# Patient Record
Sex: FEMALE | Race: WHITE | NOT HISPANIC OR LATINO
[De-identification: names, ages, dates, MRNs, and addresses within clinical notes are randomized per-mention and may not be internally consistent; named-entity substitution may affect disease eponyms.]

---

## 2017-01-29 ENCOUNTER — RX RENEWAL (OUTPATIENT)
Age: 57
End: 2017-01-29

## 2017-02-13 ENCOUNTER — APPOINTMENT (OUTPATIENT)
Dept: INFECTIOUS DISEASE | Facility: CLINIC | Age: 57
End: 2017-02-13

## 2017-02-13 DIAGNOSIS — Z71.89 OTHER SPECIFIED COUNSELING: ICD-10-CM

## 2017-03-10 ENCOUNTER — APPOINTMENT (OUTPATIENT)
Dept: ORTHOPEDIC SURGERY | Facility: CLINIC | Age: 57
End: 2017-03-10

## 2017-03-10 VITALS — WEIGHT: 119 LBS | BODY MASS INDEX: 19.13 KG/M2 | HEIGHT: 66 IN | RESPIRATION RATE: 16 BRPM

## 2017-03-10 DIAGNOSIS — Z00.00 ENCOUNTER FOR GENERAL ADULT MEDICAL EXAMINATION W/OUT ABNORMAL FINDINGS: ICD-10-CM

## 2017-03-10 DIAGNOSIS — G56.23 LESION OF ULNAR NERVE, BILATERAL UPPER LIMBS: ICD-10-CM

## 2017-04-17 ENCOUNTER — APPOINTMENT (OUTPATIENT)
Dept: PULMONOLOGY | Facility: CLINIC | Age: 57
End: 2017-04-17

## 2017-04-17 VITALS
DIASTOLIC BLOOD PRESSURE: 60 MMHG | SYSTOLIC BLOOD PRESSURE: 90 MMHG | HEART RATE: 69 BPM | BODY MASS INDEX: 19.13 KG/M2 | HEIGHT: 66 IN | WEIGHT: 119 LBS | OXYGEN SATURATION: 100 % | RESPIRATION RATE: 16 BRPM

## 2017-08-03 ENCOUNTER — APPOINTMENT (OUTPATIENT)
Dept: PULMONOLOGY | Facility: CLINIC | Age: 57
End: 2017-08-03
Payer: COMMERCIAL

## 2017-08-03 VITALS
BODY MASS INDEX: 19.13 KG/M2 | HEART RATE: 69 BPM | OXYGEN SATURATION: 100 % | RESPIRATION RATE: 17 BRPM | WEIGHT: 119 LBS | DIASTOLIC BLOOD PRESSURE: 70 MMHG | SYSTOLIC BLOOD PRESSURE: 106 MMHG | HEIGHT: 66 IN

## 2017-08-03 PROCEDURE — 99214 OFFICE O/P EST MOD 30 MIN: CPT | Mod: 25

## 2017-08-03 PROCEDURE — 94010 BREATHING CAPACITY TEST: CPT

## 2017-08-03 RX ORDER — EMOLLIENT COMBINATION NO.53
CREAM (GRAM) TOPICAL
Qty: 450 | Refills: 0 | Status: ACTIVE | COMMUNITY
Start: 2016-10-17

## 2017-08-03 RX ORDER — HALOBETASOL PROPIONATE 0.5 MG/G
0.05 CREAM TOPICAL
Qty: 15 | Refills: 0 | Status: ACTIVE | COMMUNITY
Start: 2016-10-17

## 2017-09-22 ENCOUNTER — APPOINTMENT (OUTPATIENT)
Dept: INFECTIOUS DISEASE | Facility: CLINIC | Age: 57
End: 2017-09-22

## 2017-10-20 ENCOUNTER — APPOINTMENT (OUTPATIENT)
Dept: INFECTIOUS DISEASE | Facility: CLINIC | Age: 57
End: 2017-10-20
Payer: SELF-PAY

## 2017-10-20 PROCEDURE — 90471 IMMUNIZATION ADMIN: CPT | Mod: NC

## 2017-10-20 PROCEDURE — 90632 HEPA VACCINE ADULT IM: CPT

## 2017-11-09 ENCOUNTER — APPOINTMENT (OUTPATIENT)
Dept: SURGICAL ONCOLOGY | Facility: CLINIC | Age: 57
End: 2017-11-09
Payer: COMMERCIAL

## 2017-11-09 VITALS
OXYGEN SATURATION: 94 % | DIASTOLIC BLOOD PRESSURE: 67 MMHG | BODY MASS INDEX: 19.13 KG/M2 | SYSTOLIC BLOOD PRESSURE: 109 MMHG | HEIGHT: 66 IN | HEART RATE: 75 BPM | WEIGHT: 119 LBS | RESPIRATION RATE: 16 BRPM

## 2017-11-09 PROCEDURE — 99214 OFFICE O/P EST MOD 30 MIN: CPT

## 2017-11-15 ENCOUNTER — TRANSCRIPTION ENCOUNTER (OUTPATIENT)
Age: 57
End: 2017-11-15

## 2017-11-20 ENCOUNTER — FORM ENCOUNTER (OUTPATIENT)
Age: 57
End: 2017-11-20

## 2017-11-21 ENCOUNTER — APPOINTMENT (OUTPATIENT)
Dept: MAMMOGRAPHY | Facility: CLINIC | Age: 57
End: 2017-11-21

## 2017-11-21 ENCOUNTER — APPOINTMENT (OUTPATIENT)
Dept: ULTRASOUND IMAGING | Facility: CLINIC | Age: 57
End: 2017-11-21

## 2017-11-21 ENCOUNTER — OUTPATIENT (OUTPATIENT)
Dept: OUTPATIENT SERVICES | Facility: HOSPITAL | Age: 57
LOS: 1 days | End: 2017-11-21
Payer: COMMERCIAL

## 2017-11-21 DIAGNOSIS — Z00.8 ENCOUNTER FOR OTHER GENERAL EXAMINATION: ICD-10-CM

## 2017-11-21 PROCEDURE — 77063 BREAST TOMOSYNTHESIS BI: CPT

## 2017-11-21 PROCEDURE — 77067 SCR MAMMO BI INCL CAD: CPT

## 2017-11-21 PROCEDURE — 76641 ULTRASOUND BREAST COMPLETE: CPT

## 2018-01-18 ENCOUNTER — APPOINTMENT (OUTPATIENT)
Dept: ULTRASOUND IMAGING | Facility: CLINIC | Age: 58
End: 2018-01-18

## 2018-01-18 ENCOUNTER — OUTPATIENT (OUTPATIENT)
Dept: OUTPATIENT SERVICES | Facility: HOSPITAL | Age: 58
LOS: 1 days | End: 2018-01-18
Payer: COMMERCIAL

## 2018-01-18 DIAGNOSIS — Z00.8 ENCOUNTER FOR OTHER GENERAL EXAMINATION: ICD-10-CM

## 2018-01-18 PROCEDURE — 76856 US EXAM PELVIC COMPLETE: CPT

## 2018-01-18 PROCEDURE — 76830 TRANSVAGINAL US NON-OB: CPT | Mod: 26

## 2018-01-18 PROCEDURE — 76830 TRANSVAGINAL US NON-OB: CPT

## 2018-01-18 PROCEDURE — 76856 US EXAM PELVIC COMPLETE: CPT | Mod: 26

## 2018-02-05 ENCOUNTER — APPOINTMENT (OUTPATIENT)
Dept: PULMONOLOGY | Facility: CLINIC | Age: 58
End: 2018-02-05
Payer: COMMERCIAL

## 2018-02-05 VITALS
WEIGHT: 119 LBS | OXYGEN SATURATION: 98 % | DIASTOLIC BLOOD PRESSURE: 60 MMHG | HEART RATE: 67 BPM | BODY MASS INDEX: 19.13 KG/M2 | SYSTOLIC BLOOD PRESSURE: 100 MMHG | HEIGHT: 66 IN | RESPIRATION RATE: 14 BRPM

## 2018-02-05 PROCEDURE — 99214 OFFICE O/P EST MOD 30 MIN: CPT | Mod: 25

## 2018-02-05 PROCEDURE — 95012 NITRIC OXIDE EXP GAS DETER: CPT

## 2018-02-05 PROCEDURE — 94010 BREATHING CAPACITY TEST: CPT

## 2018-02-05 RX ORDER — AZITHROMYCIN 250 MG/1
250 TABLET, FILM COATED ORAL
Qty: 5 | Refills: 0 | Status: DISCONTINUED | COMMUNITY
Start: 2017-02-13 | End: 2018-02-05

## 2018-02-09 ENCOUNTER — APPOINTMENT (OUTPATIENT)
Dept: ORTHOPEDIC SURGERY | Facility: CLINIC | Age: 58
End: 2018-02-09
Payer: COMMERCIAL

## 2018-02-09 VITALS — WEIGHT: 119 LBS | BODY MASS INDEX: 19.13 KG/M2 | RESPIRATION RATE: 16 BRPM | HEIGHT: 66 IN

## 2018-02-09 PROCEDURE — 99213 OFFICE O/P EST LOW 20 MIN: CPT | Mod: 25

## 2018-02-09 PROCEDURE — 73110 X-RAY EXAM OF WRIST: CPT | Mod: 50

## 2018-02-09 PROCEDURE — 20605 DRAIN/INJ JOINT/BURSA W/O US: CPT | Mod: RT

## 2018-08-02 ENCOUNTER — APPOINTMENT (OUTPATIENT)
Dept: PULMONOLOGY | Facility: CLINIC | Age: 58
End: 2018-08-02
Payer: COMMERCIAL

## 2018-08-02 VITALS
WEIGHT: 119 LBS | HEART RATE: 82 BPM | BODY MASS INDEX: 19.13 KG/M2 | OXYGEN SATURATION: 99 % | SYSTOLIC BLOOD PRESSURE: 100 MMHG | DIASTOLIC BLOOD PRESSURE: 70 MMHG | HEIGHT: 66 IN | RESPIRATION RATE: 17 BRPM

## 2018-08-02 PROCEDURE — 95012 NITRIC OXIDE EXP GAS DETER: CPT

## 2018-08-02 PROCEDURE — 94010 BREATHING CAPACITY TEST: CPT

## 2018-08-02 PROCEDURE — 99214 OFFICE O/P EST MOD 30 MIN: CPT | Mod: 25

## 2018-08-02 RX ORDER — ALBUTEROL SULFATE 90 UG/1
108 (90 BASE) AEROSOL, METERED RESPIRATORY (INHALATION) EVERY 6 HOURS
Qty: 3 | Refills: 1 | Status: ACTIVE | COMMUNITY
Start: 2018-08-02 | End: 1900-01-01

## 2018-08-02 RX ORDER — AMOXICILLIN AND CLAVULANATE POTASSIUM 875; 125 MG/1; MG/1
875-125 TABLET, COATED ORAL
Qty: 10 | Refills: 0 | Status: DISCONTINUED | COMMUNITY
Start: 2017-03-16 | End: 2018-08-02

## 2018-11-12 ENCOUNTER — NON-APPOINTMENT (OUTPATIENT)
Age: 58
End: 2018-11-12

## 2018-11-12 ENCOUNTER — APPOINTMENT (OUTPATIENT)
Dept: PULMONOLOGY | Facility: CLINIC | Age: 58
End: 2018-11-12
Payer: COMMERCIAL

## 2018-11-12 VITALS
SYSTOLIC BLOOD PRESSURE: 92 MMHG | DIASTOLIC BLOOD PRESSURE: 64 MMHG | WEIGHT: 119 LBS | OXYGEN SATURATION: 99 % | RESPIRATION RATE: 17 BRPM | BODY MASS INDEX: 19.13 KG/M2 | HEART RATE: 77 BPM | HEIGHT: 66 IN

## 2018-11-12 PROCEDURE — 99214 OFFICE O/P EST MOD 30 MIN: CPT | Mod: 25

## 2018-11-12 PROCEDURE — 94010 BREATHING CAPACITY TEST: CPT

## 2018-11-13 RX ORDER — FLUCONAZOLE 150 MG/1
150 TABLET ORAL
Qty: 7 | Refills: 0 | Status: COMPLETED | COMMUNITY
Start: 2018-07-30

## 2018-11-13 RX ORDER — PREDNISONE 20 MG/1
20 TABLET ORAL
Qty: 3 | Refills: 0 | Status: COMPLETED | COMMUNITY
Start: 2018-11-06

## 2018-11-13 RX ORDER — ALPRAZOLAM 0.5 MG/1
0.5 TABLET ORAL
Qty: 30 | Refills: 0 | Status: ACTIVE | COMMUNITY
Start: 2018-08-13

## 2018-11-13 RX ORDER — CYCLOBENZAPRINE HYDROCHLORIDE 10 MG/1
10 TABLET, FILM COATED ORAL
Qty: 20 | Refills: 0 | Status: ACTIVE | COMMUNITY
Start: 2018-09-21

## 2018-11-13 RX ORDER — DICLOFENAC SODIUM 10 MG/G
1 GEL TOPICAL
Qty: 100 | Refills: 0 | Status: ACTIVE | COMMUNITY
Start: 2018-10-05

## 2018-11-25 PROBLEM — N60.99 BENIGN BREAST DISEASE: Status: ACTIVE | Noted: 2017-11-09

## 2018-11-26 ENCOUNTER — APPOINTMENT (OUTPATIENT)
Dept: SURGICAL ONCOLOGY | Facility: CLINIC | Age: 58
End: 2018-11-26
Payer: COMMERCIAL

## 2018-11-26 VITALS
BODY MASS INDEX: 19.13 KG/M2 | WEIGHT: 119 LBS | RESPIRATION RATE: 15 BRPM | DIASTOLIC BLOOD PRESSURE: 73 MMHG | HEART RATE: 67 BPM | SYSTOLIC BLOOD PRESSURE: 116 MMHG | HEIGHT: 66 IN

## 2018-11-26 DIAGNOSIS — N60.99 UNSPECIFIED BENIGN MAMMARY DYSPLASIA OF UNSPECIFIED BREAST: ICD-10-CM

## 2018-11-26 DIAGNOSIS — R92.8 OTHER ABNORMAL AND INCONCLUSIVE FINDINGS ON DIAGNOSTIC IMAGING OF BREAST: ICD-10-CM

## 2018-11-26 PROCEDURE — 99213 OFFICE O/P EST LOW 20 MIN: CPT

## 2018-12-13 ENCOUNTER — FORM ENCOUNTER (OUTPATIENT)
Age: 58
End: 2018-12-13

## 2018-12-14 ENCOUNTER — APPOINTMENT (OUTPATIENT)
Dept: MAMMOGRAPHY | Facility: CLINIC | Age: 58
End: 2018-12-14

## 2018-12-14 ENCOUNTER — OUTPATIENT (OUTPATIENT)
Dept: OUTPATIENT SERVICES | Facility: HOSPITAL | Age: 58
LOS: 1 days | End: 2018-12-14
Payer: COMMERCIAL

## 2018-12-14 ENCOUNTER — APPOINTMENT (OUTPATIENT)
Dept: ULTRASOUND IMAGING | Facility: CLINIC | Age: 58
End: 2018-12-14

## 2018-12-14 DIAGNOSIS — Z00.8 ENCOUNTER FOR OTHER GENERAL EXAMINATION: ICD-10-CM

## 2018-12-14 PROCEDURE — 77067 SCR MAMMO BI INCL CAD: CPT | Mod: 26

## 2018-12-14 PROCEDURE — 77067 SCR MAMMO BI INCL CAD: CPT

## 2018-12-14 PROCEDURE — 76641 ULTRASOUND BREAST COMPLETE: CPT

## 2018-12-14 PROCEDURE — 77063 BREAST TOMOSYNTHESIS BI: CPT

## 2018-12-14 PROCEDURE — 77063 BREAST TOMOSYNTHESIS BI: CPT | Mod: 26

## 2018-12-14 PROCEDURE — 76641 ULTRASOUND BREAST COMPLETE: CPT | Mod: 26,50

## 2018-12-17 PROBLEM — R92.8 ABNORMAL FINDING ON BREAST IMAGING: Status: ACTIVE | Noted: 2018-12-17

## 2018-12-20 ENCOUNTER — FORM ENCOUNTER (OUTPATIENT)
Age: 58
End: 2018-12-20

## 2018-12-21 ENCOUNTER — OUTPATIENT (OUTPATIENT)
Dept: OUTPATIENT SERVICES | Facility: HOSPITAL | Age: 58
LOS: 1 days | End: 2018-12-21
Payer: COMMERCIAL

## 2018-12-21 ENCOUNTER — APPOINTMENT (OUTPATIENT)
Dept: MAMMOGRAPHY | Facility: CLINIC | Age: 58
End: 2018-12-21
Payer: COMMERCIAL

## 2018-12-21 DIAGNOSIS — Z00.8 ENCOUNTER FOR OTHER GENERAL EXAMINATION: ICD-10-CM

## 2018-12-21 PROCEDURE — 77065 DX MAMMO INCL CAD UNI: CPT | Mod: 26,RT

## 2018-12-21 PROCEDURE — 77065 DX MAMMO INCL CAD UNI: CPT

## 2019-02-13 ENCOUNTER — RX RENEWAL (OUTPATIENT)
Age: 59
End: 2019-02-13

## 2019-04-02 ENCOUNTER — APPOINTMENT (OUTPATIENT)
Dept: PULMONOLOGY | Facility: CLINIC | Age: 59
End: 2019-04-02
Payer: COMMERCIAL

## 2019-04-02 VITALS
HEIGHT: 66 IN | BODY MASS INDEX: 19.29 KG/M2 | OXYGEN SATURATION: 96 % | RESPIRATION RATE: 16 BRPM | DIASTOLIC BLOOD PRESSURE: 68 MMHG | HEART RATE: 75 BPM | WEIGHT: 120 LBS | SYSTOLIC BLOOD PRESSURE: 104 MMHG

## 2019-04-02 PROCEDURE — 99214 OFFICE O/P EST MOD 30 MIN: CPT | Mod: 25

## 2019-04-02 PROCEDURE — 94010 BREATHING CAPACITY TEST: CPT

## 2019-04-02 NOTE — PROCEDURE
[FreeTextEntry1] : PFT- spi reveals normal flows; FEV1 was 2.60L which is 97% of predicted; normal flow volume loop

## 2019-04-02 NOTE — ADDENDUM
[FreeTextEntry1] : Documented by Yandel Wong acting as a scribe for Dr. Paulo Vidal on 4/2//2019\par \par All medical record entries made by the Scribe were at my, Dr. Paulo Vidal's, direction and personally dictated by me on 4/2/2019. I have reviewed the chart and agree that the record accurately reflects my personal performance of the history, physical exam, assessment and plan. I have also personally directed, reviewed, and agree with the discharge instructions. \par \par \par \par \par

## 2019-04-02 NOTE — ASSESSMENT
[FreeTextEntry1] : Ms. Cooley has a history of asthma, allergy, BOOP, and sicca. She is currently stable from a pulmonary perspective. \par \par Her shortness of breath is multifactorial due to:\par -asthma\par -allergies\par -former BOOP\par -poor mechanics of breathing \par \par problem 1: asthma- stable at this time \par -continue to use Anoro at 1 inhalation QD\par -continue to use Ventolin PRN before exercise\par -continue to use Singulair 10 mg before exercise \par -Asthma is  believed to be caused by inherited (genetic) and environmental factor, but its exact cause is unknown. Asthma may be triggered by allergens, lung infections, or irritants in the air. Asthma triggers are different for each person \par -Inhaler technique reviewed as well as oral hygiene techniques reviewed with patient. Avoidance of cold air, extremes of temperature, rescue inhaler should be used before exercise. Order of medication reviewed with patient. Recommended use of a cool mist humidifier in the bedroom. \par \par problem 2: allergies\par -continue to use nasal saline PRN\par -Environmental measures for allergies were encouraged including mattress and pillow cover, air purifier, and environmental controls. \par \par problem 3: BOOP\par -in remission\par \par problem 4: sicca\par -recommended to gargle and spit after using the inhaler\par -blood work to include: SED rate, Sjogren's panel, and rheumatoid factor (pending)\par \par problem 5: epistaxis\par -recommended nasal gel/ Boroleum ointment \par \par problem 6: health maintenance \par -recommended to try optimal electrolytes\par -recommended yearly flu shot after October 15\par -recommended strep pneumonia vaccines: Prevnar-13 vaccine, followed by Pneumo vaccine 23 one year following\par -recommended early intervention for URIs\par -recommended regular osteoporosis evaluations\par -recommended early dermatological evaluations\par -recommended after the age of 50 to the age of 70, colonoscopy every 5 years \par \par \par F/U in 6 months\par She is encouraged to call with any changes, concerns, or questions

## 2019-04-02 NOTE — HISTORY OF PRESENT ILLNESS
[FreeTextEntry1] : Ms. Cooley a 57 year old female presenting to the office for a follow up visit for allergic rhinitis, asthma, BOOP, sicca, and shortness of breath. Her chief complaint is exercise-induced SOB. \par - She has noticed that in her spin class, she feels that she is the only one that needs to takes a break\par - She states that she takes her inhalers prior to exercise. \par - She otherwise feels great \par - She states that her energy level is 10/10 \par - She denies any limitations with exercise\par - Her weight is stable \par - She states that her vision is poor requiring glasses. \par - She states that she always has a dry mouth. \par - She denies any headaches, nausea, vomiting, fever, chills, sweats, chest pain, chest pressure, palpitations, coughing, wheezing, fatigue, diarrhea, constipation, dysphagia, myalgias, dizziness, leg swelling, leg pain, itchy eyes, itchy ears, heartburn, reflux, or sour taste in the mouth.

## 2019-04-02 NOTE — REASON FOR VISIT
[Follow-Up] : a follow-up visit [FreeTextEntry1] : allergic rhinitis, asthma, BOOP, sicca, and shortness of breath

## 2019-04-19 ENCOUNTER — TRANSCRIPTION ENCOUNTER (OUTPATIENT)
Age: 59
End: 2019-04-19

## 2019-06-25 ENCOUNTER — FORM ENCOUNTER (OUTPATIENT)
Age: 59
End: 2019-06-25

## 2019-06-26 ENCOUNTER — APPOINTMENT (OUTPATIENT)
Dept: MRI IMAGING | Facility: CLINIC | Age: 59
End: 2019-06-26
Payer: COMMERCIAL

## 2019-06-26 ENCOUNTER — OUTPATIENT (OUTPATIENT)
Dept: OUTPATIENT SERVICES | Facility: HOSPITAL | Age: 59
LOS: 1 days | End: 2019-06-26
Payer: COMMERCIAL

## 2019-06-26 DIAGNOSIS — Z00.8 ENCOUNTER FOR OTHER GENERAL EXAMINATION: ICD-10-CM

## 2019-06-26 DIAGNOSIS — Z91.89 OTHER SPECIFIED PERSONAL RISK FACTORS, NOT ELSEWHERE CLASSIFIED: ICD-10-CM

## 2019-06-26 PROCEDURE — 77049 MRI BREAST C-+ W/CAD BI: CPT | Mod: 26

## 2019-06-26 PROCEDURE — C8937: CPT

## 2019-06-26 PROCEDURE — C8908: CPT

## 2019-07-10 ENCOUNTER — FORM ENCOUNTER (OUTPATIENT)
Age: 59
End: 2019-07-10

## 2019-07-11 ENCOUNTER — APPOINTMENT (OUTPATIENT)
Dept: ULTRASOUND IMAGING | Facility: IMAGING CENTER | Age: 59
End: 2019-07-11
Payer: COMMERCIAL

## 2019-07-11 ENCOUNTER — OUTPATIENT (OUTPATIENT)
Dept: OUTPATIENT SERVICES | Facility: HOSPITAL | Age: 59
LOS: 1 days | End: 2019-07-11
Payer: COMMERCIAL

## 2019-07-11 DIAGNOSIS — R92.8 OTHER ABNORMAL AND INCONCLUSIVE FINDINGS ON DIAGNOSTIC IMAGING OF BREAST: ICD-10-CM

## 2019-07-11 PROCEDURE — 76705 ECHO EXAM OF ABDOMEN: CPT | Mod: 26

## 2019-07-11 PROCEDURE — 76705 ECHO EXAM OF ABDOMEN: CPT

## 2019-10-15 ENCOUNTER — APPOINTMENT (OUTPATIENT)
Dept: PULMONOLOGY | Facility: CLINIC | Age: 59
End: 2019-10-15
Payer: COMMERCIAL

## 2019-10-15 ENCOUNTER — NON-APPOINTMENT (OUTPATIENT)
Age: 59
End: 2019-10-15

## 2019-10-15 VITALS
SYSTOLIC BLOOD PRESSURE: 110 MMHG | HEIGHT: 66 IN | BODY MASS INDEX: 19.13 KG/M2 | RESPIRATION RATE: 16 BRPM | HEART RATE: 65 BPM | WEIGHT: 119 LBS | DIASTOLIC BLOOD PRESSURE: 70 MMHG | OXYGEN SATURATION: 98 %

## 2019-10-15 PROCEDURE — 94010 BREATHING CAPACITY TEST: CPT

## 2019-10-15 PROCEDURE — 99214 OFFICE O/P EST MOD 30 MIN: CPT | Mod: 25

## 2019-10-15 PROCEDURE — 95012 NITRIC OXIDE EXP GAS DETER: CPT

## 2019-10-15 NOTE — ADDENDUM
[FreeTextEntry1] : All medical record entries made by justin Covarrubias were at Dr. Paulo Vidal's, direction and personally dictated by me on 10/15/2018. I have reviewed the chart and agree that the record accurately reflects my personal performance of the history, physical exam, assessment and plan. I have also personally directed, reviewed, and agree with the discharge instructions.

## 2019-10-15 NOTE — HISTORY OF PRESENT ILLNESS
[FreeTextEntry1] : Ms. Cooley a 57 year old female presenting to the office for a follow up visit for allergic rhinitis, asthma, BOOP, sicca, and shortness of breath. Her chief complaint is exercise-induced SOB. \par -7-8 hrs of sleep\par -complaints of vision\par -family is doing well\par - She otherwise feels great \par - She states that her energy level is 10/10 \par - She denies any limitations with exercise\par - Her weight is stable \par - She states that she always has a dry mouth. \par - She denies any headaches, nausea, vomiting, fever, chills, sweats, chest pain, chest pressure, palpitations, coughing, wheezing, fatigue, diarrhea, constipation, dysphagia, myalgias, dizziness, leg swelling, leg pain, itchy eyes, itchy ears, heartburn, reflux, or sour taste in the mouth.

## 2019-10-15 NOTE — PHYSICAL EXAM
[General Appearance - Well Developed] : well developed [Well Groomed] : well groomed [Normal Appearance] : normal appearance [No Deformities] : no deformities [General Appearance - Well Nourished] : well nourished [Normal Conjunctiva] : the conjunctiva exhibited no abnormalities [General Appearance - In No Acute Distress] : no acute distress [Eyelids - No Xanthelasma] : the eyelids demonstrated no xanthelasmas [Normal Oropharynx] : normal oropharynx [Neck Appearance] : the appearance of the neck was normal [II] : II [Thyroid Diffuse Enlargement] : the thyroid was not enlarged [Jugular Venous Distention Increased] : there was no jugular-venous distention [Neck Cervical Mass (___cm)] : no neck mass was observed [Heart Sounds] : normal S1 and S2 [Heart Rate And Rhythm] : heart rate and rhythm were normal [Thyroid Nodule] : there were no palpable thyroid nodules [Murmurs] : no murmurs present [Respiration, Rhythm And Depth] : normal respiratory rhythm and effort [Exaggerated Use Of Accessory Muscles For Inspiration] : no accessory muscle use [Auscultation Breath Sounds / Voice Sounds] : lungs were clear to auscultation bilaterally [Abdomen Soft] : soft [Abdomen Tenderness] : non-tender [Abnormal Walk] : normal gait [Abdomen Mass (___ Cm)] : no abdominal mass palpated [Nail Clubbing] : no clubbing of the fingernails [Gait - Sufficient For Exercise Testing] : the gait was sufficient for exercise testing [Petechial Hemorrhages (___cm)] : no petechial hemorrhages [Cyanosis, Localized] : no localized cyanosis [] : no rash [No Venous Stasis] : no venous stasis [Skin Color & Pigmentation] : normal skin color and pigmentation [Skin Lesions] : no skin lesions [No Skin Ulcers] : no skin ulcer [Deep Tendon Reflexes (DTR)] : deep tendon reflexes were 2+ and symmetric [No Xanthoma] : no  xanthoma was observed [Sensation] : the sensory exam was normal to light touch and pinprick [Oriented To Time, Place, And Person] : oriented to person, place, and time [No Focal Deficits] : no focal deficits [Impaired Insight] : insight and judgment were intact [Affect] : the affect was normal [FreeTextEntry1] : I:E 1:3, clear

## 2019-10-15 NOTE — PROCEDURE
[FreeTextEntry1] : PFT- spi reveals normal flows; FEV1 was 3.69L which is 102% of predicted; normal flow volume loop \par \par FENO was 8; a normal value being less than 25\par \par Fractional exhaled nitric oxide (FENO) is regarded as a simple, noninvasive method for assessing eosinophilic airway inflammation. Produced by a variety of cells within the lung, nitric oxide (NO) concentrations are generally low in healthy individuals. However, high concentrations of NO appear to be involved in nonspecific host defense mechanisms and chronic inflammatory diseases such as asthma. The American Thoracic Society (ATS) therefore has recommended using FENO to aid in the diagnosis and monitoring of eosinophilic airway inflammation and asthma, and for identifying steroid responsive individuals whose chronic respiratory symptoms may be caused by airway inflammation. \par \par

## 2019-11-08 ENCOUNTER — APPOINTMENT (OUTPATIENT)
Dept: ORTHOPEDIC SURGERY | Facility: CLINIC | Age: 59
End: 2019-11-08
Payer: COMMERCIAL

## 2019-11-08 VITALS — BODY MASS INDEX: 19.13 KG/M2 | HEIGHT: 66 IN | WEIGHT: 119 LBS | RESPIRATION RATE: 16 BRPM

## 2019-11-08 DIAGNOSIS — M25.731 OSTEOPHYTE, RIGHT WRIST: ICD-10-CM

## 2019-11-08 DIAGNOSIS — M24.131 OTHER ARTICULAR CARTILAGE DISORDERS, RIGHT WRIST: ICD-10-CM

## 2019-11-08 DIAGNOSIS — M77.01 MEDIAL EPICONDYLITIS, RIGHT ELBOW: ICD-10-CM

## 2019-11-08 DIAGNOSIS — G56.03 CARPAL TUNNEL SYNDROM,BILATERAL UPPER LIMBS: ICD-10-CM

## 2019-11-08 DIAGNOSIS — M77.11 LATERAL EPICONDYLITIS, RIGHT ELBOW: ICD-10-CM

## 2019-11-08 PROCEDURE — 73070 X-RAY EXAM OF ELBOW: CPT | Mod: RT

## 2019-11-08 PROCEDURE — 99214 OFFICE O/P EST MOD 30 MIN: CPT | Mod: 25

## 2019-11-08 PROCEDURE — 73110 X-RAY EXAM OF WRIST: CPT | Mod: 50

## 2019-11-08 PROCEDURE — 20605 DRAIN/INJ JOINT/BURSA W/O US: CPT | Mod: RT

## 2019-11-08 PROCEDURE — 20600 DRAIN/INJ JOINT/BURSA W/O US: CPT | Mod: F5

## 2019-11-08 NOTE — PHYSICAL EXAM
[de-identified] : Physical exam shows the patient to be alert and oriented x3, capable of ambulation. The patient is well-developed and well-nourished in no apparent respiratory distress. Majority of the skin is intact bilaterally in the upper extremities without lymphadenopathy at the elbows.\par \par There is tenderness over the right medial epicondyles evidence of instability no joint effusion. There is no tenderness or instability medial lateral collateral ligaments the biceps and triceps are nontender and intact with full range of motion the elbow without pain pump for his pronation supination flexion or extension. There is tenderness over the medial upper condyle which is accentuated by wrist flexion and pronation on the right side. Negative on the left there is no instability of the ulnar nerve but there is positive Tinel's and Phalen's over the nerve as compared the left side.\par \par There is tenderness over the right basal joint with a positive grind test and shoulder deformity. There is a positive crank test. There is swelling appreciated over the affected CMC joint which is not present on the opposite side. There is no evidence of infection or lymphadenopathy bilaterally to the level of the elbows There is no evidence of MCP hyperextension bilaterally. There is a negative Finkelstein's test There is no tenderness over the A-1 pulleys bilaterally. 5/5 strength bilaterally. \par \par The wrists have a symmetric and full range of motion bilaterally with no pain upon forced flexion, extension, pronation and supination. There is no tenderness over the scaphoid scapholunate region ligaments and no tenderness of the TFCC bilaterally. There is no tenderness of the pisotriquetral hamate hook. The second through fifth CMC his is stable and nontender bilaterally.\par \par There is a positive Tinel's and Phalen's over the median nerve the right wrist\par \par Sensation is grossly intact [de-identified] : PA lateral of the right elbow shows joint space is well preserved with minimal degenerative changes and no evidence of soft tissue calcifications.\par \par PA lateral and oblique of the wrist bilaterally show minimal degenerative changes of the CMC and STT joints with joint space is well preserved and symmetric bilaterally.\par \par Scapholunate ligament stress views and neutral radial and ulnar deviation bilaterally show no evidence of instability as compared the opposite asymptomatic side.

## 2019-11-08 NOTE — HISTORY OF PRESENT ILLNESS
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 She had a steroid injection on 2/9/2018 on the wrist which lasted 6 months. EMG studies done on 2/28/19 showed abnormal studies of both wrists, mild bilateral carpal tunnel syndrome and bilateral median nerve entrapment

## 2019-11-08 NOTE — ASSESSMENT
[FreeTextEntry1] : Patient chief complaint appear to be coming from her right thumb CMC joint of 3 months duration without history of trauma.\par \par She continues to have right elbow medial epicondylitis and cubital tunnel syndrome. This is not demonstrated on electrodiagnostic studies with carpal tunnel syndrome is seen on electrodiagnostic studies.\par \par The risks, benefits, alternatives and associated differential diagnosis was discussed with the patient. Options ranged from conservative care to surgical intervention were reviewed and all questions answered. Patient appeared to have an excellent understanding of the risks as well as differential diagnosis associated with this condition.\par \par She elected to proceed with an injection for the right thumb CMC joint and right elbow medial epicondylitis with therapy for the cubital tunnel and other issues.\par \par It is hoped that this has a long-lasting beneficial therapeutic effect. If symptoms are not fully resolved by 4-6 weeks the patient was asked to return to my office for reevaluation. If symptoms are resolved they can return on an as-needed basis.\par She may also consider rheumatologic workup due to that her multiple joint complaints.\par If symptoms continue she should return to the office for reevaluation and more aggressive forms of treatment may be considered such as  surgery.

## 2019-12-02 ENCOUNTER — APPOINTMENT (OUTPATIENT)
Dept: SURGICAL ONCOLOGY | Facility: CLINIC | Age: 59
End: 2019-12-02

## 2020-01-09 ENCOUNTER — FORM ENCOUNTER (OUTPATIENT)
Age: 60
End: 2020-01-09

## 2020-01-10 ENCOUNTER — APPOINTMENT (OUTPATIENT)
Dept: MAMMOGRAPHY | Facility: CLINIC | Age: 60
End: 2020-01-10
Payer: COMMERCIAL

## 2020-01-10 ENCOUNTER — APPOINTMENT (OUTPATIENT)
Dept: ULTRASOUND IMAGING | Facility: CLINIC | Age: 60
End: 2020-01-10
Payer: COMMERCIAL

## 2020-01-10 ENCOUNTER — OUTPATIENT (OUTPATIENT)
Dept: OUTPATIENT SERVICES | Facility: HOSPITAL | Age: 60
LOS: 1 days | End: 2020-01-10
Payer: COMMERCIAL

## 2020-01-10 DIAGNOSIS — Z00.8 ENCOUNTER FOR OTHER GENERAL EXAMINATION: ICD-10-CM

## 2020-01-10 PROCEDURE — 77063 BREAST TOMOSYNTHESIS BI: CPT | Mod: 26

## 2020-01-10 PROCEDURE — 76641 ULTRASOUND BREAST COMPLETE: CPT | Mod: 26,50

## 2020-01-10 PROCEDURE — 77063 BREAST TOMOSYNTHESIS BI: CPT

## 2020-01-10 PROCEDURE — 77067 SCR MAMMO BI INCL CAD: CPT

## 2020-01-10 PROCEDURE — 76641 ULTRASOUND BREAST COMPLETE: CPT

## 2020-01-10 PROCEDURE — 77067 SCR MAMMO BI INCL CAD: CPT | Mod: 26

## 2020-01-14 ENCOUNTER — FORM ENCOUNTER (OUTPATIENT)
Age: 60
End: 2020-01-14

## 2020-01-15 ENCOUNTER — APPOINTMENT (OUTPATIENT)
Dept: MRI IMAGING | Facility: CLINIC | Age: 60
End: 2020-01-15
Payer: COMMERCIAL

## 2020-01-15 ENCOUNTER — OUTPATIENT (OUTPATIENT)
Dept: OUTPATIENT SERVICES | Facility: HOSPITAL | Age: 60
LOS: 1 days | End: 2020-01-15
Payer: COMMERCIAL

## 2020-01-15 DIAGNOSIS — R92.8 OTHER ABNORMAL AND INCONCLUSIVE FINDINGS ON DIAGNOSTIC IMAGING OF BREAST: ICD-10-CM

## 2020-01-15 PROCEDURE — 77049 MRI BREAST C-+ W/CAD BI: CPT | Mod: 26

## 2020-01-15 PROCEDURE — C8937: CPT

## 2020-01-15 PROCEDURE — A9585: CPT

## 2020-01-15 PROCEDURE — C8908: CPT

## 2020-03-06 ENCOUNTER — APPOINTMENT (OUTPATIENT)
Dept: SURGICAL ONCOLOGY | Facility: CLINIC | Age: 60
End: 2020-03-06
Payer: COMMERCIAL

## 2020-03-06 VITALS
RESPIRATION RATE: 16 BRPM | SYSTOLIC BLOOD PRESSURE: 118 MMHG | HEART RATE: 60 BPM | WEIGHT: 119 LBS | BODY MASS INDEX: 19.13 KG/M2 | HEIGHT: 66 IN | OXYGEN SATURATION: 100 % | DIASTOLIC BLOOD PRESSURE: 71 MMHG

## 2020-03-06 PROCEDURE — 99214 OFFICE O/P EST MOD 30 MIN: CPT

## 2020-03-06 NOTE — PHYSICAL EXAM
[Normal] : supple, no neck mass and thyroid not enlarged [Normal Neck Lymph Nodes] : normal neck lymph nodes  [Normal Supraclavicular Lymph Nodes] : normal supraclavicular lymph nodes [Normal Axillary Lymph Nodes] : normal axillary lymph nodes [Normal] : normal appearance, no rash, nodules, vesicles, ulcers, erythema [de-identified] : Groins not examined [de-identified] : below

## 2020-03-06 NOTE — ASSESSMENT
[FreeTextEntry1] : January 2020:\par Annual bilateral breast MRI (increased risk of breast cancer): BI-RADS 3.\par A prescription for a 6 month followup MRI in July 2020 he has already been entered - verified today.\par \par January 2020 bilateral mammogram and sonogram at [facility]: BI-RADS 2.\par Prescription entered today for January 2021\par \par Previously incidental hepatic finding on her 2019 breast MRI was a cyst on abdominal ultrasound.\par We will reassess this in July 2020, that prescription was entered today also.\par \par Clinically doing well.\par \par If asymptomatic, with normal imaging, we will see her in another year, sooner if needed

## 2020-03-06 NOTE — REASON FOR VISIT
[Follow-Up Visit] : a follow-up visit for [Other: _____] : [unfilled] [FreeTextEntry2] : Genetic predisposition to breast cancer

## 2020-03-06 NOTE — HISTORY OF PRESENT ILLNESS
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 \par \par Menarche was at age 14. \par She's had 2 pregnancies, both delivered, first at 26.\par \par Her Nette risk score is 17.\par Breast cancer risk score of (calculated 11-25-18) = 35.2\par \par \par Her internist is Dr. Steven GOLDBERG.\par \par She does not have a pacemaker or defibrillator.\par She takes no anticoagulants.\par \par +Asthma.\par She sees Dr. Paulo LUI\par She uses inhalers.\par \par \par GYN: DR Pennie DINH, May 2019 visit was unremarkable\par Her gynecologist was Dr. Gabriella MARTINEZ, September 2017 visit was okay\par \par \par Colonoscopy at age 51 with Dr. Buchanan was unremarkable.\par She had a followup evaluation at age 56 which was also ok\par \par CC: none

## 2020-04-29 ENCOUNTER — RX RENEWAL (OUTPATIENT)
Age: 60
End: 2020-04-29

## 2020-05-10 NOTE — ASSESSMENT
[FreeTextEntry1] : Ms. Cooley has a history of asthma, allergy, BOOP, and sicca. She is currently stable from a pulmonary perspective except dry mouth. \par \par Her shortness of breath is multifactorial due to:\par -asthma\par -allergies\par -former BOOP\par -poor mechanics of breathing \par \par problem 1: asthma- stable at this time \par -continue to use Anoro at 1 inhalation QD\par -continue to use Ventolin PRN before exercise\par -continue to use Singulair 10 mg before exercise \par -Asthma is  believed to be caused by inherited (genetic) and environmental factor, but its exact cause is unknown. Asthma may be triggered by allergens, lung infections, or irritants in the air. Asthma triggers are different for each person \par -Inhaler technique reviewed as well as oral hygiene techniques reviewed with patient. Avoidance of cold air, extremes of temperature, rescue inhaler should be used before exercise. Order of medication reviewed with patient. Recommended use of a cool mist humidifier in the bedroom. \par \par problem 2: allergies\par -continue to use nasal saline PRN\par -Environmental measures for allergies were encouraged including mattress and pillow cover, air purifier, and environmental controls. \par \par problem 3: BOOP\par -in remission\par \par problem 4: sicca\par -recommended to gargle and spit after using the inhaler, throat coat/slippery elm tea\par -blood work to include: SED rate, Sjogren's panel, and rheumatoid factor (pending)\par \par problem 5: epistaxis\par -recommended nasal gel/ Boroleum ointment \par \par problem 6: health maintenance \par -recommended to try optimal electrolytes\par -recommended yearly flu shot after October 15\par -recommended strep pneumonia vaccines: Prevnar-13 vaccine, followed by Pneumo vaccine 23 one year following\par -recommended early intervention for URIs\par -recommended regular osteoporosis evaluations\par -recommended early dermatological evaluations\par -recommended after the age of 50 to the age of 70, colonoscopy every 5 years \par \par \par F/U in 6 months, spi niox\par She is encouraged to call with any changes, concerns, or questions  CONSTITUTIONAL: well developed, nontoxic appearing, in mild distress 2/2 pain, speaking in full sentences  SKIN: warm, dry, no rash, cap refill < 2 seconds  HEENT: normocephalic, atraumatic, no conjunctival erythema, moist mucous membranes, patent airway  NECK: supple, no masses  CV:  tachycardic rate, regular rhythm, 2+ radial pulses bilaterally  RESP: no wheezes, no rales, no rhonchi, normal work of breathing  ABD: soft, nontender, nondistended, no rebound, no guarding  BACK: L CVA tenderness  MSK: normal ROM, no cyanosis, no edema  NEURO: alert, oriented, grossly unremarkable  PSYCH: cooperative, appropriate

## 2020-07-21 ENCOUNTER — OUTPATIENT (OUTPATIENT)
Dept: OUTPATIENT SERVICES | Facility: HOSPITAL | Age: 60
LOS: 1 days | End: 2020-07-21
Payer: COMMERCIAL

## 2020-07-21 ENCOUNTER — RESULT REVIEW (OUTPATIENT)
Age: 60
End: 2020-07-21

## 2020-07-21 ENCOUNTER — APPOINTMENT (OUTPATIENT)
Dept: ULTRASOUND IMAGING | Facility: CLINIC | Age: 60
End: 2020-07-21
Payer: COMMERCIAL

## 2020-07-21 DIAGNOSIS — Z00.8 ENCOUNTER FOR OTHER GENERAL EXAMINATION: ICD-10-CM

## 2020-07-21 PROCEDURE — 76705 ECHO EXAM OF ABDOMEN: CPT | Mod: 26

## 2020-07-21 PROCEDURE — 76705 ECHO EXAM OF ABDOMEN: CPT

## 2020-07-23 ENCOUNTER — RESULT REVIEW (OUTPATIENT)
Age: 60
End: 2020-07-23

## 2020-07-23 ENCOUNTER — OUTPATIENT (OUTPATIENT)
Dept: OUTPATIENT SERVICES | Facility: HOSPITAL | Age: 60
LOS: 1 days | End: 2020-07-23
Payer: COMMERCIAL

## 2020-07-23 ENCOUNTER — APPOINTMENT (OUTPATIENT)
Dept: ULTRASOUND IMAGING | Facility: CLINIC | Age: 60
End: 2020-07-23
Payer: COMMERCIAL

## 2020-07-23 ENCOUNTER — APPOINTMENT (OUTPATIENT)
Dept: MRI IMAGING | Facility: CLINIC | Age: 60
End: 2020-07-23
Payer: COMMERCIAL

## 2020-07-23 DIAGNOSIS — Z91.89 OTHER SPECIFIED PERSONAL RISK FACTORS, NOT ELSEWHERE CLASSIFIED: ICD-10-CM

## 2020-07-23 PROCEDURE — C8908: CPT

## 2020-07-23 PROCEDURE — 77049 MRI BREAST C-+ W/CAD BI: CPT | Mod: 26

## 2020-07-23 PROCEDURE — C8937: CPT

## 2020-07-23 PROCEDURE — A9585: CPT

## 2020-07-30 ENCOUNTER — APPOINTMENT (OUTPATIENT)
Dept: PULMONOLOGY | Facility: CLINIC | Age: 60
End: 2020-07-30
Payer: COMMERCIAL

## 2020-07-30 PROCEDURE — 99214 OFFICE O/P EST MOD 30 MIN: CPT | Mod: 95

## 2020-07-30 RX ORDER — ALBUTEROL SULFATE 90 UG/1
108 (90 BASE) AEROSOL, METERED RESPIRATORY (INHALATION) EVERY 6 HOURS
Qty: 3 | Refills: 1 | Status: ACTIVE | COMMUNITY
Start: 2017-08-03 | End: 1900-01-01

## 2020-07-30 RX ORDER — AMOXICILLIN AND CLAVULANATE POTASSIUM 875; 125 MG/1; MG/1
875-125 TABLET, COATED ORAL
Qty: 20 | Refills: 0 | Status: DISCONTINUED | COMMUNITY
Start: 2018-11-12 | End: 2020-07-30

## 2020-07-30 NOTE — ADDENDUM
[FreeTextEntry1] : Documented by Sherwin Jin acting as a scribe for Dr. Paulo Vidal on 07/30/2020.\par \par All medical record entries made by the Scribe were at my, Dr. Paulo Vidal's, direction and personally dictated by me on 07/30/2020. I have reviewed the chart and agree that the record accurately reflects my personal performance of the history, physical exam, assessment and plan. I have also personally directed, reviewed, and agree with the discharge instructions.

## 2020-07-30 NOTE — ASSESSMENT
[FreeTextEntry1] : Ms. Cooley is a 59 year old female who has a history of herniated disc, asthma, allergy, BOOP, and sicca. She is currently stable from a pulmonary perspective now with dry mouth. \par \par Her shortness of breath is multifactorial due to:\par -asthma\par -allergies\par -former BOOP\par -poor mechanics of breathing \par \par problem 1: asthma- stable at this time \par -continue to use Anoro at 1 inhalation QD\par -continue to use Ventolin PRN before exercise\par -continue to use Singulair 10 mg before exercise \par -Asthma is  believed to be caused by inherited (genetic) and environmental factor, but its exact cause is unknown. Asthma may be triggered by allergens, lung infections, or irritants in the air. Asthma triggers are different for each person \par -Inhaler technique reviewed as well as oral hygiene techniques reviewed with patient. Avoidance of cold air, extremes of temperature, rescue inhaler should be used before exercise. Order of medication reviewed with patient. Recommended use of a cool mist humidifier in the bedroom. \par \par problem 2: allergies (stable)\par -continue to use nasal saline PRN\par -Environmental measures for allergies were encouraged including mattress and pillow cover, air purifier, and environmental controls. \par \par problem 3: BOOP\par -in remission\par \par problem 4: sicca (due to Anoro)\par -recommended to gargle and spit after using the inhaler, throat coat/slippery elm tea\par -blood work to include: SED rate, Sjogren's panel, and rheumatoid factor (pending)\par \par problem 5: epistaxis\par -recommended nasal gel/ Boroleum ointment \par \par problem 6: Health Maintenance/COVID19 Precautions:\par - Clean your hands often. Wash your hands often with soap and water for at least 20 seconds, especially after blowing your nose, coughing, or sneezing, or having been in a public place.\par - If soap and water are not available, use a hand  that contains at least 60% alcohol.\par - To the extent possible, avoid touching high-touch surfaces in public places - elevator buttons, door handles, handrails, handshaking with people, etc. Use a tissue or your sleeve to cover your hand or finger if you must touch something.\par - Wash your hands after touching surfaces in public places.\par - Avoid touching your face, nose, eyes, etc.\par - Clean and disinfect your home to remove germs: practice routine cleaning of frequently touched surfaces (for example: tables, doorknobs, light switches, handles, desks, toilets, faucets, sinks & cell phones)\par - Avoid crowds, especially in poorly ventilated spaces. Your risk of exposure to respiratory viruses like COVID-19 may increase in crowded, closed-in settings with little air circulation if there are people in the crowd who are sick. All patients are recommended to practice social distancing and stay at least 6 feet away from others.\par - Avoid all non-essential travel including plane trips, and especially avoid embarking on cruise ships.\par -If COVID-19 is spreading in your community, take extra measures to put distance between yourself and other people to further reduce your risk of being exposed to this new virus.\par -Stay home as much as possible.\par - Consider ways of getting food brought to your house through family, social, or commercial networks\par -Be aware that the virus has been known to live in the air up to 3 hours post exposure, cardboard up to 24 hours post exposure, copper up to 4 hours post exposure, steel and plastic up to 2-3 days post exposure. Risk of transmission from these surfaces are affected by many variables.\par Immune Support Recommendations:\par -OTC Vitamin C 500mg BID \par -OTC Quercetin 250-500mg BID \par -OTC Zinc 75-100mg per day \par -OTC Melatonin 1 or 2 mg a night \par -OTC Vitamin D 1-4000mg per day \par -OTC Tonic Water 8oz per day\par Asthma and COVID19:\par You need to make sure your asthma is under control. This often requires the use of inhaled corticosteroids (and sometimes oral corticosteroids). Inhaled corticosteroids do not likely reduce your immune system’s ability to fight infections, but oral corticosteroids may. It is important to use the steps above to protect yourself to limit your exposure to any respiratory virus. \par \par problem 7: health maintenance \par -recommended to try optimal electrolytes\par -recommended yearly flu shot after October 15\par -recommended strep pneumonia vaccines: Prevnar-13 vaccine, followed by Pneumo vaccine 23 one year following\par -recommended early intervention for URIs\par -recommended regular osteoporosis evaluations\par -recommended early dermatological evaluations\par -recommended after the age of 50 to the age of 70, colonoscopy every 5 years \par \par \par F/U in 6 months, spi niox, Full PFTs\par She is encouraged to call with any changes, concerns, or questions

## 2020-07-30 NOTE — HISTORY OF PRESENT ILLNESS
[Home] : at home, [unfilled] , at the time of the visit. [Medical Office: ([facility])___] : at the medical office located in  [Verbal consent obtained from patient] : the patient, [unfilled] [FreeTextEntry1] : Ms. Cooley a 59 year old female video calling to the office for a follow up visit for allergic rhinitis, asthma, BOOP, sicca, and shortness of breath. Her chief complaint is \par \par -she notes generally feeling well\par -she notes recent episode of throwing out back while playing tennis\par -she notes history of herniations in back\par -she notes energy levels very high\par -she notes sleeping well\par -She notes Her bowels are regular\par -she notes exercising playing tennis two days a week\par -she denies orthopedic issues in her knees\par -she denies epistaxis \par -she notes breathing controlled with Rx\par -she denies wheeze\par -she notes prophylaxis with Vitamin C and D, multivitamin\par -she notes dry mouth\par -she notes proper hydration\par -she notes planning to see chiropractor\par \par -she denies any chest pain, chest pressure, diarrhea, constipation, dysphagia, dizziness, sour taste in the mouth, leg swelling, leg pain, itchy eyes, itchy ears, heartburn, reflux

## 2020-07-30 NOTE — REASON FOR VISIT
[Follow-Up] : a follow-up visit [FreeTextEntry1] : video call- allergic rhinitis, asthma, BOOP, sicca, and shortness of breath

## 2020-08-06 ENCOUNTER — APPOINTMENT (OUTPATIENT)
Dept: ORTHOPEDIC SURGERY | Facility: CLINIC | Age: 60
End: 2020-08-06

## 2021-01-14 ENCOUNTER — RESULT REVIEW (OUTPATIENT)
Age: 61
End: 2021-01-14

## 2021-01-14 ENCOUNTER — APPOINTMENT (OUTPATIENT)
Dept: MAMMOGRAPHY | Facility: CLINIC | Age: 61
End: 2021-01-14
Payer: COMMERCIAL

## 2021-01-14 ENCOUNTER — OUTPATIENT (OUTPATIENT)
Dept: OUTPATIENT SERVICES | Facility: HOSPITAL | Age: 61
LOS: 1 days | End: 2021-01-14
Payer: COMMERCIAL

## 2021-01-14 ENCOUNTER — APPOINTMENT (OUTPATIENT)
Dept: ULTRASOUND IMAGING | Facility: CLINIC | Age: 61
End: 2021-01-14
Payer: COMMERCIAL

## 2021-01-14 DIAGNOSIS — Z00.00 ENCOUNTER FOR GENERAL ADULT MEDICAL EXAMINATION WITHOUT ABNORMAL FINDINGS: ICD-10-CM

## 2021-01-14 PROCEDURE — 77063 BREAST TOMOSYNTHESIS BI: CPT | Mod: 26

## 2021-01-14 PROCEDURE — 77063 BREAST TOMOSYNTHESIS BI: CPT

## 2021-01-14 PROCEDURE — 77067 SCR MAMMO BI INCL CAD: CPT | Mod: 26

## 2021-01-14 PROCEDURE — 77067 SCR MAMMO BI INCL CAD: CPT

## 2021-01-14 PROCEDURE — 76641 ULTRASOUND BREAST COMPLETE: CPT | Mod: 26,50

## 2021-01-14 PROCEDURE — 76641 ULTRASOUND BREAST COMPLETE: CPT

## 2021-03-25 ENCOUNTER — APPOINTMENT (OUTPATIENT)
Dept: SURGICAL ONCOLOGY | Facility: CLINIC | Age: 61
End: 2021-03-25
Payer: COMMERCIAL

## 2021-03-25 VITALS
WEIGHT: 120 LBS | OXYGEN SATURATION: 98 % | BODY MASS INDEX: 19.29 KG/M2 | HEIGHT: 66 IN | HEART RATE: 71 BPM | TEMPERATURE: 98.1 F | SYSTOLIC BLOOD PRESSURE: 129 MMHG | RESPIRATION RATE: 16 BRPM | DIASTOLIC BLOOD PRESSURE: 87 MMHG

## 2021-03-25 DIAGNOSIS — Z91.89 OTHER SPECIFIED PERSONAL RISK FACTORS, NOT ELSEWHERE CLASSIFIED: ICD-10-CM

## 2021-03-25 PROCEDURE — 99214 OFFICE O/P EST MOD 30 MIN: CPT

## 2021-03-25 PROCEDURE — 99072 ADDL SUPL MATRL&STAF TM PHE: CPT

## 2021-03-26 NOTE — REVIEW OF SYSTEMS
[Negative] : Endocrine [FreeTextEntry6] : Asthma [FreeTextEntry1] : Genetic predisposition to breast cancer and colorectal cancer

## 2021-03-26 NOTE — PHYSICAL EXAM
[Normal] : supple, no neck mass and thyroid not enlarged [Normal Neck Lymph Nodes] : normal neck lymph nodes  [Normal Supraclavicular Lymph Nodes] : normal supraclavicular lymph nodes [Normal Axillary Lymph Nodes] : normal axillary lymph nodes [Normal] : normal appearance, no rash, nodules, vesicles, ulcers, erythema [de-identified] : Groins not examined [de-identified] : below

## 2021-03-26 NOTE — HISTORY OF PRESENT ILLNESS
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 Pennie Lincoln) has made an appointment for her to meet with medical genetics at [facility] in the spring 2021.\par \par +FH:\par Her sister had breast cancer at age 48, and tested negative for any deleterious genetic predisposition.\par She also has a maternal AND paternal great aunt with breast cancer.\par \par No relatives with ovarian cancer.\par \par Not Ashkenazi.\par \par \par A core biopsy of the left breast in July 2012 our facility was benign.\par No other procedures related to either breast.\par \par Menarche was at age 14. \par She's had 2 pregnancies, both delivered, first at 26.\par \par Her Nette risk score is 17.\par Breast cancer risk score of (calculated 11-25-18) = 35.2\par \par \par Her internist is Dr. Steven GOLDBERG.\par \par She does not have a pacemaker or defibrillator.\par She takes no anticoagulants.\par \par +Asthma.\par She sees Dr. Paulo LUI\par She uses inhalers.\par \par \par GYN: DR Pennie LINCOLN, February 2021 visit was unremarkable\par Her gynecologist was Dr. Gabriella MARTINEZ, September 2017 visit was okay\par \par \par Baseline colonoscopy at age 51 with Dr. Buchanan was unremarkable.\par She had a followup evaluation May 2019 which was also ok\par \par CC: none.\par \par December 2020 she had right elbow tendon repair at [facility]

## 2021-03-26 NOTE — ASSESSMENT
[FreeTextEntry1] : January 2021:\par Bilateral mammogram and sonogram at [place]: BI-RADS 2.\par Prescriptions entered for July 2022.\par \par July 2020:\par Bilateral breast MRI at [place]: BI-RADS 2.\par We will repeat summer 2021, prescriptions entered.\par \par Clinically doing well.\par \par If asymptomatic, with normal imaging, we will see her in another year, sooner if needed

## 2021-07-16 ENCOUNTER — NON-APPOINTMENT (OUTPATIENT)
Age: 61
End: 2021-07-16

## 2021-11-01 ENCOUNTER — NON-APPOINTMENT (OUTPATIENT)
Age: 61
End: 2021-11-01

## 2021-11-01 ENCOUNTER — APPOINTMENT (OUTPATIENT)
Dept: PULMONOLOGY | Facility: CLINIC | Age: 61
End: 2021-11-01
Payer: COMMERCIAL

## 2021-11-01 VITALS
TEMPERATURE: 97.6 F | HEART RATE: 80 BPM | BODY MASS INDEX: 19.29 KG/M2 | RESPIRATION RATE: 16 BRPM | WEIGHT: 120 LBS | HEIGHT: 66 IN | SYSTOLIC BLOOD PRESSURE: 108 MMHG | OXYGEN SATURATION: 97 % | DIASTOLIC BLOOD PRESSURE: 60 MMHG

## 2021-11-01 PROCEDURE — 99214 OFFICE O/P EST MOD 30 MIN: CPT | Mod: 25

## 2021-11-01 PROCEDURE — 94010 BREATHING CAPACITY TEST: CPT

## 2021-11-01 RX ORDER — AZELASTINE HYDROCHLORIDE 137 UG/1
137 SPRAY, METERED NASAL
Qty: 1 | Refills: 1 | Status: ACTIVE | COMMUNITY
Start: 2018-11-12 | End: 1900-01-01

## 2021-11-01 NOTE — PHYSICAL EXAM
[No Acute Distress] : no acute distress [Well Nourished] : well nourished [Well Groomed] : well groomed [No Deformities] : no deformities [Well Developed] : well developed [Normal Oropharynx] : normal oropharynx [Normal Appearance] : normal appearance [Supple] : supple [No Neck Mass] : no neck mass [No JVD] : no jvd [Normal Rate/Rhythm] : normal rate/rhythm [Normal S1, S2] : normal s1, s2 [No Murmurs] : no murmurs [No Resp Distress] : no resp distress [No Acc Muscle Use] : no acc muscle use [Normal Palpation] : normal palpation [Normal Rhythm and Effort] : normal rhythm and effort [Clear to Auscultation Bilaterally] : clear to auscultation bilaterally [No Abnormalities] : no abnormalities [Benign] : benign [Normal Gait] : normal gait [No Clubbing] : no clubbing [No Cyanosis] : no cyanosis [No Edema] : no edema [FROM] : FROM [Normal Color/ Pigmentation] : normal color/ pigmentation [No Focal Deficits] : no focal deficits [Oriented x3] : oriented x3 [Normal Mood] : normal mood [Normal Affect] : normal affect [Remote Memory Normal] : remote memory normal

## 2021-11-01 NOTE — ASSESSMENT
[FreeTextEntry1] : Ms. Cooley is a 59 year old female who has a history of herniated disc, asthma, allergy, BOOP, and sicca. She is currently stable from a pulmonary perspective but with some post nasal drip. \par \par problem 1: asthma- stable at this time \par -continue to use Anoro at 1 inhalation QD rinse and gargle\par -continue to use Ventolin PRN before exercise\par -continue to use Singulair 10 mg po QHS\par -Asthma is  believed to be caused by inherited (genetic) and environmental factor, but its exact cause is unknown. Asthma may be triggered by allergens, lung infections, or irritants in the air. Asthma triggers are different for each person \par -Inhaler technique reviewed as well as oral hygiene techniques reviewed with patient. Avoidance of cold air, extremes of temperature, rescue inhaler should be used before exercise. Order of medication reviewed with patient. Recommended use of a cool mist humidifier in the bedroom. \par \par problem 2:post nasal drip\par -continue to use nasal saline PRN\par -add Azelastine nasal spray 2 sprays am and pm\par -add Flonase OTC 2 sprays in am\par -hold claritin D for now- see if treated with nasal sprays\par \par problem 3: BOOP\par -in remission\par \par problem 4: sicca (due to Anoro)\par -recommended to gargle and spit after using the inhaler, throat coat/slippery elm tea\par \par Asthma and COVID19:\par You need to make sure your asthma is under control. This often requires the use of inhaled corticosteroids (and sometimes oral corticosteroids). Inhaled corticosteroids do not likely reduce your immune system’s ability to fight infections, but oral corticosteroids may. It is important to use the steps above to protect yourself to limit your exposure to any respiratory virus. \par \par F/U in 6 months, spi niox\par She is encouraged to call with any changes, concerns, or questions

## 2021-11-01 NOTE — HISTORY OF PRESENT ILLNESS
[TextBox_4] : Ms. Cooley a 61 year old female presenting to the office for a follow up visit for allergic rhinitis, asthma, BOOP, sicca, and shortness of breath who is in for follow up and med refills. \par \par Since her last visit here in 2020- no changes to her health or medication. \par She reports she was in her normal state of health until friday- now has mild post nasal drip- started on Claritin D and Flonase with some effect. Still with dripping. \par \par She reports she is compliant on her Anoro without issue. \par She is active- spins, exercises. Gets a bit breathy with Spinning- has not tried Ventolin Pre-exercise. Has not needed the rescue inhaler for any reason.\par \par She is otherwise doing well. \par No cough, SOB, chest pain or pressure. Denies recent illness. She had a covid test recently and was negative. \par She denies GERD symptoms or other GI issues.

## 2022-01-26 ENCOUNTER — APPOINTMENT (OUTPATIENT)
Dept: MAMMOGRAPHY | Facility: CLINIC | Age: 62
End: 2022-01-26
Payer: COMMERCIAL

## 2022-01-26 ENCOUNTER — OUTPATIENT (OUTPATIENT)
Dept: OUTPATIENT SERVICES | Facility: HOSPITAL | Age: 62
LOS: 1 days | End: 2022-01-26
Payer: COMMERCIAL

## 2022-01-26 ENCOUNTER — RESULT REVIEW (OUTPATIENT)
Age: 62
End: 2022-01-26

## 2022-01-26 ENCOUNTER — APPOINTMENT (OUTPATIENT)
Dept: ULTRASOUND IMAGING | Facility: CLINIC | Age: 62
End: 2022-01-26
Payer: COMMERCIAL

## 2022-01-26 DIAGNOSIS — Z91.89 OTHER SPECIFIED PERSONAL RISK FACTORS, NOT ELSEWHERE CLASSIFIED: ICD-10-CM

## 2022-01-26 PROCEDURE — 76641 ULTRASOUND BREAST COMPLETE: CPT

## 2022-01-26 PROCEDURE — 76641 ULTRASOUND BREAST COMPLETE: CPT | Mod: 26,50

## 2022-01-26 PROCEDURE — 77063 BREAST TOMOSYNTHESIS BI: CPT | Mod: 26

## 2022-01-26 PROCEDURE — 77067 SCR MAMMO BI INCL CAD: CPT | Mod: 26

## 2022-01-26 PROCEDURE — 77067 SCR MAMMO BI INCL CAD: CPT

## 2022-01-26 PROCEDURE — 77063 BREAST TOMOSYNTHESIS BI: CPT

## 2022-03-18 ENCOUNTER — APPOINTMENT (OUTPATIENT)
Dept: PULMONOLOGY | Facility: CLINIC | Age: 62
End: 2022-03-18
Payer: COMMERCIAL

## 2022-03-18 PROCEDURE — 99214 OFFICE O/P EST MOD 30 MIN: CPT | Mod: 95

## 2022-03-18 RX ORDER — PREDNISONE 10 MG/1
10 TABLET ORAL
Qty: 50 | Refills: 0 | Status: ACTIVE | COMMUNITY
Start: 2022-03-18 | End: 1900-01-01

## 2022-03-18 NOTE — ASSESSMENT
[FreeTextEntry1] : Ms. Cooley is a 61 year old female who has a history of herniated disc, asthma, allergy, BOOP, and sicca. She is currently dealing with COVID-19 induced bronchospasm\par \par Her shortness of breath is multifactorial due to:\par -asthma\par -allergies\par -former BOOP\par -poor mechanics of breathing \par \par Problem 1A: COVID-19 3/2022\par -Recommend Throat Coat Tea\par -Add Symbicort 160 2 inhalations BID \par -Add Claritin 10 mg QAM \par -Add Benadryl 25 QHS \par -No MAB\par -No Paxlovid \par -Add Prednisone 20mg for 7 days then 10mg for 7 days \par -Information sheet given to the patient to be reviewed, this medication is never to be used without consulting the prescribing physician. Proper dietary restraint is necessary specifically salt containing foods, if any reaction may occur should be reported. \par COVID-19 precautionary Immune Support Recommendations:\par -OTC Vitamin C 500mg BID \par -OTC Quercetin 250-500mg BID \par -OTC Zinc 75-100mg per day \par -OTC Melatonin 1 or 2mg a night \par -OTC Vitamin D 1-4000mg per day \par \par \par problem 1: asthma\par -continue to use Anoro at 1 inhalation QD\par -continue to use Ventolin PRN before exercise\par -continue to use Singulair 10 mg before exercise \par -Asthma is  believed to be caused by inherited (genetic) and environmental factor, but its exact cause is unknown. Asthma may be triggered by allergens, lung infections, or irritants in the air. Asthma triggers are different for each person \par -Inhaler technique reviewed as well as oral hygiene techniques reviewed with patient. Avoidance of cold air, extremes of temperature, rescue inhaler should be used before exercise. Order of medication reviewed with patient. Recommended use of a cool mist humidifier in the bedroom. \par \par problem 2: allergies (stable)\par -continue to use nasal saline PRN\par -Environmental measures for allergies were encouraged including mattress and pillow cover, air purifier, and environmental controls. \par \par problem 3: BOOP\par -in remission\par \par problem 4: sicca (due to Anoro)\par -recommended to gargle and spit after using the inhaler, throat coat/slippery elm tea\par -blood work to include: SED rate, Sjogren's panel, and rheumatoid factor (pending)\par \par problem 5: epistaxis\par -recommended nasal gel/ Boroleum ointment \par \par problem 6: Health Maintenance/COVID19 Precautions:\par - Clean your hands often. Wash your hands often with soap and water for at least 20 seconds, especially after blowing your nose, coughing, or sneezing, or having been in a public place.\par - If soap and water are not available, use a hand  that contains at least 60% alcohol.\par - To the extent possible, avoid touching high-touch surfaces in public places - elevator buttons, door handles, handrails, handshaking with people, etc. Use a tissue or your sleeve to cover your hand or finger if you must touch something.\par - Wash your hands after touching surfaces in public places.\par - Avoid touching your face, nose, eyes, etc.\par - Clean and disinfect your home to remove germs: practice routine cleaning of frequently touched surfaces (for example: tables, doorknobs, light switches, handles, desks, toilets, faucets, sinks & cell phones)\par - Avoid crowds, especially in poorly ventilated spaces. Your risk of exposure to respiratory viruses like COVID-19 may increase in crowded, closed-in settings with little air circulation if there are people in the crowd who are sick. All patients are recommended to practice social distancing and stay at least 6 feet away from others.\par - Avoid all non-essential travel including plane trips, and especially avoid embarking on cruise ships.\par -If COVID-19 is spreading in your community, take extra measures to put distance between yourself and other people to further reduce your risk of being exposed to this new virus.\par -Stay home as much as possible.\par - Consider ways of getting food brought to your house through family, social, or commercial networks\par -Be aware that the virus has been known to live in the air up to 3 hours post exposure, cardboard up to 24 hours post exposure, copper up to 4 hours post exposure, steel and plastic up to 2-3 days post exposure. Risk of transmission from these surfaces are affected by many variables.\par Immune Support Recommendations:\par -OTC Vitamin C 500mg BID \par -OTC Quercetin 250-500mg BID \par -OTC Zinc 75-100mg per day \par -OTC Melatonin 1 or 2 mg a night \par -OTC Vitamin D 1-4000mg per day \par -OTC Tonic Water 8oz per day\par Asthma and COVID19:\par You need to make sure your asthma is under control. This often requires the use of inhaled corticosteroids (and sometimes oral corticosteroids). Inhaled corticosteroids do not likely reduce your immune system’s ability to fight infections, but oral corticosteroids may. It is important to use the steps above to protect yourself to limit your exposure to any respiratory virus. \par \par problem 7: health maintenance \par -recommended to try optimal electrolytes\par -recommended yearly flu shot after October 15\par -recommended strep pneumonia vaccines: Prevnar-13 vaccine, followed by Pneumo vaccine 23 one year following\par -recommended early intervention for URIs\par -recommended regular osteoporosis evaluations\par -recommended early dermatological evaluations\par -recommended after the age of 50 to the age of 70, colonoscopy every 5 years \par \par \par F/U in 6 months, spi niox, Full PFTs\par She is encouraged to call with any changes, concerns, or questions

## 2022-03-18 NOTE — HISTORY OF PRESENT ILLNESS
[Home] : at home, [unfilled] , at the time of the visit. [Medical Office: ([facility])___] : at the medical office located in  [Verbal consent obtained from patient] : the patient, [unfilled] [FreeTextEntry1] : Ms. Cooley a 61 year old female video calling to the office for a follow up visit for allergic rhinitis, asthma, BOOP, sicca, and shortness of breath. Her chief complaint is \par -she notes she went to a wedding in [place] and on Monday felt a scratchy throat and found out someone there had COVID\par -she notes testing negative at Tuesday at home but then tested Wednesday positive\par -she notes her Sx started with a scratchy throat and then developed a low appetite and HA\par -she notes feeling like she has the flu with no fever\par -she notes her nose feels dry but she can breathe out of it\par -she notes feeling like having a panic attack when she was going to sleep Wednesday night because her HR was 115 when it is usually low (50s)\par -she notes her HR is currently in the 80s\par -she notes getting the chills every 10 min\par -she notes feeling SOB when she talks\par -she notes feeling more SOB than normal\par -she notes her  also has COVID\par -she notes her bowels are regular \par -she notes her sense of smell and taste are not good\par -she notes she does not have a cough\par -she notes some chest tightness Tuesday and Wednesday but not so much today\par -she notes taking Quercetin 500mg BID, Vitamin C 2000mg\par -she notes she has Claritin D and Flonase\par -she notes nocturia \par \par -patient denies any headaches, nausea, vomiting, fever, sweats, chest pain, palpitations, coughing, wheezing, fatigue, diarrhea, constipation, dysphagia, myalgias, dizziness, leg swelling, leg pain, itchy eyes, itchy ears, heartburn, reflux or sour taste in the mouth

## 2022-03-18 NOTE — ADDENDUM
[FreeTextEntry1] : Documented by Rolf King acting as a scribe for Dr. Paulo Vidal on 03/18/2022\par \par All medical record entries made by the scribe were at my, Dr. Paluo Vidal's, direction and personally dictated by me on 03/18/2022. I have reviewed the chart and agree that the record accurately reflects my personal performance of the history, physical exam, assessment, and plan. I have also personally directed, reviewed, and agree with the discharge instructions.

## 2022-03-21 ENCOUNTER — NON-APPOINTMENT (OUTPATIENT)
Age: 62
End: 2022-03-21

## 2022-03-22 ENCOUNTER — TRANSCRIPTION ENCOUNTER (OUTPATIENT)
Age: 62
End: 2022-03-22

## 2022-03-22 ENCOUNTER — NON-APPOINTMENT (OUTPATIENT)
Age: 62
End: 2022-03-22

## 2022-04-07 ENCOUNTER — APPOINTMENT (OUTPATIENT)
Dept: SURGICAL ONCOLOGY | Facility: CLINIC | Age: 62
End: 2022-04-07

## 2022-07-28 ENCOUNTER — APPOINTMENT (OUTPATIENT)
Dept: PULMONOLOGY | Facility: CLINIC | Age: 62
End: 2022-07-28

## 2022-07-28 ENCOUNTER — NON-APPOINTMENT (OUTPATIENT)
Age: 62
End: 2022-07-28

## 2022-07-28 VITALS
HEART RATE: 77 BPM | OXYGEN SATURATION: 98 % | WEIGHT: 120 LBS | BODY MASS INDEX: 19.29 KG/M2 | DIASTOLIC BLOOD PRESSURE: 80 MMHG | SYSTOLIC BLOOD PRESSURE: 110 MMHG | RESPIRATION RATE: 16 BRPM | TEMPERATURE: 97.2 F | HEIGHT: 66 IN

## 2022-07-28 PROCEDURE — 94010 BREATHING CAPACITY TEST: CPT

## 2022-07-28 PROCEDURE — 95012 NITRIC OXIDE EXP GAS DETER: CPT

## 2022-07-28 PROCEDURE — 99214 OFFICE O/P EST MOD 30 MIN: CPT | Mod: 25

## 2022-07-28 RX ORDER — MUPIROCIN 20 MG/G
2 OINTMENT TOPICAL
Qty: 22 | Refills: 0 | Status: ACTIVE | COMMUNITY
Start: 2022-02-22

## 2022-07-28 NOTE — HISTORY OF PRESENT ILLNESS
[FreeTextEntry1] : Ms. Cooley a 61 year old female presenting to the office for a follow up visit for allergic rhinitis, asthma, BOOP, sicca, and shortness of breath. Her chief complaint is s/p covid-19 infection \par -she notes having a terrible case of covid-19 \par -she notes almost going to to the hospital \par -she denies vision issues\par -she notes stable vision\par -pt states normal bowel movements \par -she notes sleeping 8hrs/night \par -she notes prednisone stabilized things quite well \par -she notes feeling well overall and not much in the way of complaints\par -she notes nocturia only once a night \par -patient denies any headaches, nausea, vomiting, fever, sweats, chest pain, palpitations, coughing, wheezing, fatigue, diarrhea, constipation, dysphagia, myalgias, dizziness, leg swelling, leg pain, itchy eyes, itchy ears, heartburn, reflux or sour taste in the mouth

## 2022-07-28 NOTE — ADDENDUM
[FreeTextEntry1] : Documented by Bob Lino acting as a scribe for Dr. Paulo Vidal on 07/28/2022 .\par \par All medical record entries made by the Scribe were at my, Dr. Paulo Vidal's, direction and personally dictated by me on. I have reviewed the chart and agree that the record accurately reflects my personal performance of the history, physical exam, assessment and plan. I have also personally directed, reviewed, and agree with the discharge instructions.

## 2022-07-28 NOTE — ASSESSMENT
[FreeTextEntry1] : Ms. Cooley is a 61 year old female who has a history of herniated disc, asthma, allergy, BOOP, and sicca. She is s/p dealing with COVID-19 induced bronchospasm -now resolved/ @ baseline\par \par Her shortness of breath is multifactorial due to:\par -asthma\par -allergies\par -former BOOP\par -poor mechanics of breathing \par \par Problem 1A: COVID-19 3/2022 (resolved) \par -Recommend Throat Coat Tea\par -hold Symbicort 160 2 inhalations BID \par -Add Claritin 10 mg QAM \par -Add Benadryl 25 QHS \par -No MAB\par -No Paxlovid \par -Add Prednisone 20mg for 7 days then 10mg for 7 days \par -Information sheet given to the patient to be reviewed, this medication is never to be used without consulting the prescribing physician. Proper dietary restraint is necessary specifically salt containing foods, if any reaction may occur should be reported. \par COVID-19 precautionary Immune Support Recommendations:\par -OTC Vitamin C 500mg BID \par -OTC Quercetin 250-500mg BID \par -OTC Zinc 75-100mg per day \par -OTC Melatonin 1 or 2mg a night \par -OTC Vitamin D 1-4000mg per day \par \par \par problem 1: asthma (quiet) \par -continue to use Anoro at 1 inhalation QD\par -continue to use Ventolin PRN before exercise\par -continue to use Singulair 10 mg before exercise \par -Asthma is  believed to be caused by inherited (genetic) and environmental factor, but its exact cause is unknown. Asthma may be triggered by allergens, lung infections, or irritants in the air. Asthma triggers are different for each person \par -Inhaler technique reviewed as well as oral hygiene techniques reviewed with patient. Avoidance of cold air, extremes of temperature, rescue inhaler should be used before exercise. Order of medication reviewed with patient. Recommended use of a cool mist humidifier in the bedroom. \par \par problem 2: allergies (stable)\par -continue to use nasal saline PRN\par -Environmental measures for allergies were encouraged including mattress and pillow cover, air purifier, and environmental controls. \par \par problem 3: BOOP\par -in remission\par \par problem 4: sicca (due to Anoro)\par -recommended to gargle and spit after using the inhaler, throat coat/slippery elm tea\par -blood work to include: SED rate, Sjogren's panel, and rheumatoid factor (pending)\par \par problem 5: epistaxis\par -recommended nasal gel/ Boroleum ointment \par \par problem 6: Health Maintenance/COVID19 Precautions:\par -Covid-19 x3 vaccine\par - Clean your hands often. Wash your hands often with soap and water for at least 20 seconds, especially after blowing your nose, coughing, or sneezing, or having been in a public place.\par - If soap and water are not available, use a hand  that contains at least 60% alcohol.\par - To the extent possible, avoid touching high-touch surfaces in public places - elevator buttons, door handles, handrails, handshaking with people, etc. Use a tissue or your sleeve to cover your hand or finger if you must touch something.\par - Wash your hands after touching surfaces in public places.\par - Avoid touching your face, nose, eyes, etc.\par - Clean and disinfect your home to remove germs: practice routine cleaning of frequently touched surfaces (for example: tables, doorknobs, light switches, handles, desks, toilets, faucets, sinks & cell phones)\par - Avoid crowds, especially in poorly ventilated spaces. Your risk of exposure to respiratory viruses like COVID-19 may increase in crowded, closed-in settings with little air circulation if there are people in the crowd who are sick. All patients are recommended to practice social distancing and stay at least 6 feet away from others.\par - Avoid all non-essential travel including plane trips, and especially avoid embarking on cruise ships.\par -If COVID-19 is spreading in your community, take extra measures to put distance between yourself and other people to further reduce your risk of being exposed to this new virus.\par -Stay home as much as possible.\par - Consider ways of getting food brought to your house through family, social, or commercial networks\par -Be aware that the virus has been known to live in the air up to 3 hours post exposure, cardboard up to 24 hours post exposure, copper up to 4 hours post exposure, steel and plastic up to 2-3 days post exposure. Risk of transmission from these surfaces are affected by many variables.\par Immune Support Recommendations:\par -OTC Vitamin C 500mg BID \par -OTC Quercetin 250-500mg BID \par -OTC Zinc 75-100mg per day \par -OTC Melatonin 1 or 2 mg a night \par -OTC Vitamin D 1-4000mg per day \par -OTC Tonic Water 8oz per day\par Asthma and COVID19:\par You need to make sure your asthma is under control. This often requires the use of inhaled corticosteroids (and sometimes oral corticosteroids). Inhaled corticosteroids do not likely reduce your immune system’s ability to fight infections, but oral corticosteroids may. It is important to use the steps above to protect yourself to limit your exposure to any respiratory virus. \par \par problem 7: health maintenance \par -Recommended Kevin Perry book on 10 day Detox \par -recommended to try optimal electrolytes\par -s/p shingles shot \par -recommended yearly flu shot after October 15\par -recommended strep pneumonia vaccines: Prevnar-13 vaccine, followed by Pneumo vaccine 23 one year following\par -recommended early intervention for URIs\par -recommended regular osteoporosis evaluations\par -recommended early dermatological evaluations\par -recommended after the age of 50 to the age of 70, colonoscopy every 5 years \par \par \par F/U in 6 months, spi niox, Full PFTs\par She is encouraged to call with any changes, concerns, or questions

## 2022-07-28 NOTE — PROCEDURE
[FreeTextEntry1] : FENO was    8   ; a normal value being less than 25\par Fractional exhaled nitric oxide (FENO) is regarded as a simple, noninvasive method for assessing eosinophilic airway inflammation. Produced by a variety of cells within the lung, nitric oxide (NO) concentrations are generally low in healthy individuals. However, high concentrations of NO appear to be involved in nonspecific host defense mechanisms and chronic inflammatory diseases such as asthma. The American Thoracic Society (ATS) therefore has recommended using FENO to aid in the diagnosis and monitoring of eosinophilic airway inflammation and asthma, and for identifying steroid responsive individuals whose chronic respiratory symptoms may be caused by airway inflammation. \par \par PFT - spi reveals normal flows; FEV1 is 2.51 which is 91% of predicted, normal flow volume loop

## 2022-10-17 ENCOUNTER — OUTPATIENT (OUTPATIENT)
Dept: OUTPATIENT SERVICES | Facility: HOSPITAL | Age: 62
LOS: 1 days | End: 2022-10-17
Payer: COMMERCIAL

## 2022-10-17 ENCOUNTER — APPOINTMENT (OUTPATIENT)
Dept: MRI IMAGING | Facility: CLINIC | Age: 62
End: 2022-10-17

## 2022-10-17 DIAGNOSIS — Z00.8 ENCOUNTER FOR OTHER GENERAL EXAMINATION: ICD-10-CM

## 2022-10-17 PROCEDURE — C8937: CPT

## 2022-10-17 PROCEDURE — 77049 MRI BREAST C-+ W/CAD BI: CPT | Mod: 26

## 2022-10-17 PROCEDURE — A9585: CPT

## 2022-10-17 PROCEDURE — C8908: CPT

## 2022-10-31 DIAGNOSIS — Z11.59 ENCOUNTER FOR SCREENING FOR OTHER VIRAL DISEASES: ICD-10-CM

## 2022-10-31 LAB
CORONAVIRUS (229E,HKU1,NL63,OC43): DETECTED
RAPID RVP RESULT: DETECTED
SARS-COV-2 RNA PNL RESP NAA+PROBE: NOT DETECTED

## 2022-10-31 RX ORDER — CICLESONIDE 160 UG/1
160 AEROSOL, METERED RESPIRATORY (INHALATION) TWICE DAILY
Qty: 1 | Refills: 3 | Status: ACTIVE | COMMUNITY
Start: 2022-10-31 | End: 1900-01-01

## 2022-11-01 ENCOUNTER — NON-APPOINTMENT (OUTPATIENT)
Age: 62
End: 2022-11-01

## 2023-01-23 ENCOUNTER — APPOINTMENT (OUTPATIENT)
Dept: PULMONOLOGY | Facility: CLINIC | Age: 63
End: 2023-01-23
Payer: COMMERCIAL

## 2023-01-23 ENCOUNTER — NON-APPOINTMENT (OUTPATIENT)
Age: 63
End: 2023-01-23

## 2023-01-23 VITALS
OXYGEN SATURATION: 98 % | WEIGHT: 120 LBS | TEMPERATURE: 97.2 F | RESPIRATION RATE: 16 BRPM | HEART RATE: 75 BPM | HEIGHT: 66 IN | SYSTOLIC BLOOD PRESSURE: 110 MMHG | DIASTOLIC BLOOD PRESSURE: 70 MMHG | BODY MASS INDEX: 19.29 KG/M2

## 2023-01-23 DIAGNOSIS — B34.2 CORONAVIRUS INFECTION, UNSPECIFIED: ICD-10-CM

## 2023-01-23 PROCEDURE — 94010 BREATHING CAPACITY TEST: CPT

## 2023-01-23 PROCEDURE — 99214 OFFICE O/P EST MOD 30 MIN: CPT | Mod: 25

## 2023-01-23 PROCEDURE — 95012 NITRIC OXIDE EXP GAS DETER: CPT

## 2023-01-23 RX ORDER — NEOMYCIN AND POLYMYXIN B SULFATES AND DEXAMETHASONE 3.5; 10000; 1 MG/G; [IU]/G; MG/G
3.5-10000-0.1 OINTMENT OPHTHALMIC
Qty: 4 | Refills: 0 | Status: ACTIVE | COMMUNITY
Start: 2022-12-12

## 2023-01-23 RX ORDER — OFLOXACIN 3 MG/ML
0.3 SOLUTION/ DROPS OPHTHALMIC
Qty: 5 | Refills: 0 | Status: ACTIVE | COMMUNITY
Start: 2022-11-02

## 2023-01-23 RX ORDER — BUDESONIDE AND FORMOTEROL FUMARATE DIHYDRATE 160; 4.5 UG/1; UG/1
160-4.5 AEROSOL RESPIRATORY (INHALATION) TWICE DAILY
Qty: 1 | Refills: 4 | Status: DISCONTINUED | COMMUNITY
Start: 2022-03-19 | End: 2023-01-23

## 2023-01-23 NOTE — PHYSICAL EXAM
[No Acute Distress] : no acute distress [Well Nourished] : well nourished [No Deformities] : no deformities [Well Developed] : well developed [Normal Oropharynx] : normal oropharynx [II] : Mallampati Class: II [Normal Appearance] : normal appearance [No Neck Mass] : no neck mass [Normal Rate/Rhythm] : normal rate/rhythm [Normal S1, S2] : normal s1, s2 [No Murmurs] : no murmurs [No Resp Distress] : no resp distress [Clear to Auscultation Bilaterally] : clear to auscultation bilaterally [No Abnormalities] : no abnormalities [Benign] : benign [Normal Gait] : normal gait [No Clubbing] : no clubbing [No Cyanosis] : no cyanosis [No Edema] : no edema [FROM] : FROM [Normal Color/ Pigmentation] : normal color/ pigmentation [No Focal Deficits] : no focal deficits [Oriented x3] : oriented x3 [Normal Affect] : normal affect [TextBox_68] : I:E ratio 1:3; clear

## 2023-01-23 NOTE — ASSESSMENT
[FreeTextEntry1] : Ms. Cooley is a 62 year old female who has a history of herniated disc, asthma, allergy, BOOP, and sicca. S/p  COVID-19 induced bronchospasm -now resolved/ @ baseline; coronavirus for 2022 - stable \par \par Her shortness of breath is multifactorial due to:\par -asthma\par -allergies\par -former BOOP\par -poor mechanics of breathing \par \par Problem 1A: COVID-19 3/2022 (resolved) \par -Recommend Throat Coat Tea\par -hold Symbicort 160 2 inhalations BID \par -Add Claritin 10 mg QAM \par -Add Benadryl 25 QHS \par -No MAB\par -no Paxlovid \par -s/p Prednisone 20mg for 7 days then 10mg for 7 days \par -Information sheet given to the patient to be reviewed, this medication is never to be used without consulting the prescribing physician. Proper dietary restraint is necessary specifically salt containing foods, if any reaction may occur should be reported. \par COVID-19 precautionary Immune Support Recommendations:\par -OTC Vitamin C 500mg BID \par -OTC Quercetin 250-500mg BID \par -OTC Zinc 75-100mg per day \par -OTC Melatonin 1 or 2mg a night \par -OTC Vitamin D 1-4000mg per day \par \par \par problem 1: asthma (quiet) \par -continue to use Anoro at 1 inhalation QD\par -continue to use Ventolin PRN before exercise\par -continue to use Singulair 10 mg before exercise \par -Asthma is  believed to be caused by inherited (genetic) and environmental factor, but its exact cause is unknown. Asthma may be triggered by allergens, lung infections, or irritants in the air. Asthma triggers are different for each person \par -Inhaler technique reviewed as well as oral hygiene techniques reviewed with patient. Avoidance of cold air, extremes of temperature, rescue inhaler should be used before exercise. Order of medication reviewed with patient. Recommended use of a cool mist humidifier in the bedroom. \par \par Problem 1B: Coronavirus Fall 2022 - resolved \par \par problem 2: allergies (stable)\par -continue to use nasal saline PRN\par -Environmental measures for allergies were encouraged including mattress and pillow cover, air purifier, and environmental controls. \par \par problem 3: BOOP\par -in remission\par \par problem 4: sicca (due to Anoro)\par -recommended to gargle and spit after using the inhaler, throat coat/slippery elm tea\par -blood work to include: SED rate, Sjogren's panel, and rheumatoid factor (pending)\par \par problem 5: epistaxis\par -recommended nasal gel/ Boroleum ointment \par \par problem 6: Health Maintenance/COVID19 Precautions:\par -Covid-19 x3 vaccine\par - Clean your hands often. Wash your hands often with soap and water for at least 20 seconds, especially after blowing your nose, coughing, or sneezing, or having been in a public place.\par - If soap and water are not available, use a hand  that contains at least 60% alcohol.\par - To the extent possible, avoid touching high-touch surfaces in public places - elevator buttons, door handles, handrails, handshaking with people, etc. Use a tissue or your sleeve to cover your hand or finger if you must touch something.\par - Wash your hands after touching surfaces in public places.\par - Avoid touching your face, nose, eyes, etc.\par - Clean and disinfect your home to remove germs: practice routine cleaning of frequently touched surfaces (for example: tables, doorknobs, light switches, handles, desks, toilets, faucets, sinks & cell phones)\par - Avoid crowds, especially in poorly ventilated spaces. Your risk of exposure to respiratory viruses like COVID-19 may increase in crowded, closed-in settings with little air circulation if there are people in the crowd who are sick. All patients are recommended to practice social distancing and stay at least 6 feet away from others.\par - Avoid all non-essential travel including plane trips, and especially avoid embarking on cruise ships.\par -If COVID-19 is spreading in your community, take extra measures to put distance between yourself and other people to further reduce your risk of being exposed to this new virus.\par -Stay home as much as possible.\par - Consider ways of getting food brought to your house through family, social, or commercial networks\par -Be aware that the virus has been known to live in the air up to 3 hours post exposure, cardboard up to 24 hours post exposure, copper up to 4 hours post exposure, steel and plastic up to 2-3 days post exposure. Risk of transmission from these surfaces are affected by many variables.\par Immune Support Recommendations:\par -OTC Vitamin C 500mg BID \par -OTC Quercetin 250-500mg BID \par -OTC Zinc 75-100mg per day \par -OTC Melatonin 1 or 2 mg a night \par -OTC Vitamin D 1-4000mg per day \par -OTC Tonic Water 8oz per day\par Asthma and COVID19:\par You need to make sure your asthma is under control. This often requires the use of inhaled corticosteroids (and sometimes oral corticosteroids). Inhaled corticosteroids do not likely reduce your immune system’s ability to fight infections, but oral corticosteroids may. It is important to use the steps above to protect yourself to limit your exposure to any respiratory virus. \par \par problem 7: health maintenance \par -Recommended Kevin Vicky book on 10 day Detox \par -recommended to try optimal electrolytes\par -s/p shingles shot \par -recommended yearly flu shot after October 15\par -recommended strep pneumonia vaccines: Prevnar-13 vaccine, followed by Pneumo vaccine 23 one year following\par -recommended early intervention for URIs\par -recommended regular osteoporosis evaluations\par -recommended early dermatological evaluations\par -recommended after the age of 50 to the age of 70, colonoscopy every 5 years \par \par \par F/U in 6 months, spi niox, Full PFTs\par She is encouraged to call with any changes, concerns, or questions

## 2023-01-23 NOTE — HISTORY OF PRESENT ILLNESS
[FreeTextEntry1] : Ms. Cooley a 61 year old female presenting to the office for a follow up visit for allergic rhinitis, asthma, BOOP, sicca, and shortness of breath. Her chief complaint is \par - she notes she has been exercising, about 5 days a week. She plays a lot of Tennis, usually 2x a week. \par -  she notes she has been exercising about 5 days a week \par - no headaches, nausea, vomiting\par - she denies any reflux \par - she notes her weight is stable \par - her sleep pattern is very well, she gets about 8 hours of sleep a night\par - no back or neck pain\par - she notes she had plantar fascitis \par -She denies any visual issues, headaches, nausea, vomiting, fever, chills, sweats, chest pains, chest pressure, diarrhea, constipation, dysphagia, dizziness, leg swelling, leg pain, itchy eyes, itchy ears, heartburn, reflux.\par

## 2023-01-23 NOTE — PROCEDURE
[FreeTextEntry1] : PFT reveals normal flows, with an FEV1 of 2.59  L, which is 95 % of predicted, with normal flow volume loop \par \par FENO was 22; normal value being less than 25\par Fractional exhaled nitric oxide (FENO) is regarded as a simple, noninvasive method for assessing eosinophilic airway inflammation. Produced by a variety of cells within the lung, nitric oxide (NO) concentrations are generally low in healthy individuals. However, high concentrations of NO appear to be involved in nonspecific host defense mechanisms and chronic inflammatory diseases such as asthma. The American Thoracic Society (ATS) therefore has recommended using FENO to aid in the diagnosis and monitoring of eosinophilic airway inflammation and asthma, and for identifying steroid responsive individuals whose chronic respiratory symptoms may be airway inflammation.

## 2023-01-23 NOTE — ADDENDUM
[FreeTextEntry1] : Documented by Angeli Stein acting as a scribe for Dr. Paulo Vidal on 01/23/2023 \par \par All medical record entries made by the Scribe were at my, Dr. Paulo Vidal's, direction and personally dictated by me on 01/23/2023 . I have reviewed the chart and agree that the record accurately reflects my personal performance of the history, physical exam, assessment and plan. I have also personally directed, reviewed, and agree with the discharge instructions.

## 2023-05-05 ENCOUNTER — APPOINTMENT (OUTPATIENT)
Dept: PULMONOLOGY | Facility: CLINIC | Age: 63
End: 2023-05-05
Payer: COMMERCIAL

## 2023-05-05 DIAGNOSIS — R05.9 COUGH, UNSPECIFIED: ICD-10-CM

## 2023-05-05 DIAGNOSIS — R09.82 POSTNASAL DRIP: ICD-10-CM

## 2023-05-05 PROCEDURE — 99213 OFFICE O/P EST LOW 20 MIN: CPT | Mod: 95

## 2023-05-05 RX ORDER — ALBUTEROL SULFATE 90 UG/1
108 (90 BASE) AEROSOL, METERED RESPIRATORY (INHALATION)
Qty: 1 | Refills: 5 | Status: ACTIVE | COMMUNITY
Start: 2021-11-01 | End: 1900-01-01

## 2023-05-05 RX ORDER — MONTELUKAST 10 MG/1
10 TABLET, FILM COATED ORAL
Qty: 1 | Refills: 1 | Status: ACTIVE | COMMUNITY
Start: 2019-04-02 | End: 1900-01-01

## 2023-05-05 RX ORDER — BENZONATATE 200 MG/1
200 CAPSULE ORAL 3 TIMES DAILY
Qty: 60 | Refills: 0 | Status: ACTIVE | COMMUNITY
Start: 2022-11-02 | End: 1900-01-01

## 2023-05-05 NOTE — REVIEW OF SYSTEMS
[Fatigue] : fatigue [Chills] : chills [Poor Appetite] : poor appetite [Nasal Congestion] : nasal congestion [Postnasal Drip] : postnasal drip [Cough] : cough [Chest Tightness] : chest tightness [Negative] : Psychiatric [Fever] : no fever [Recent Wt Gain (___ Lbs)] : ~T no recent weight gain [EDS] : no EDS [Recent Wt Loss (___ Lbs)] : ~T no recent weight loss [Dry Eyes] : no dry eyes [Ear Disturbance] : no ear disturbance [Epistaxis] : no epistaxis [Sore Throat] : no sore throat [Eye Irritation] : no eye irritation [Dry Mouth] : no dry mouth [Sinus Problems] : no sinus problems [Mouth Ulcers] : no mouth ulcers [Poor Dentition] : no poor dentition [Edentulous] : no edentulous [Hemoptysis] : no hemoptysis [Frequent URIs] : no frequent URIs [Sputum] : no sputum [Dyspnea] : no dyspnea [Pleuritic Pain] : no pleuritic pain [Wheezing] : no wheezing [A.M. Dry Mouth] : no a.m. dry mouth [SOB on Exertion] : no sob on exertion

## 2023-05-05 NOTE — HISTORY OF PRESENT ILLNESS
[TextBox_4] : Ms. Cooley a 62 year old female history of allergic rhinitis, asthma, BOOP, sicca, COVID infection who presents via video for sick visit. \par \par Her chief complaint is chest congestion. \par \par Patient reports she was exposed to RSV over the weekend from her granddaughter.  She reports since Tuesday she is experiencing postnasal drip, voice hoarseness, nasal congestion, chills, chest tightness, loss of appetite and fatigue.  She states she had RVP done yesterday.  Patient is currently using Anoro inhaler daily.  She states she has been taking Sudafed and Flonase for the last 2 days with some benefit.  Patient has azelastine nasal spray also at home but has not been using it. Patient reports occasional dry cough. \par \par Patient denies fever, SOB @ rest or exertion, wheezing, or heartburn/reflux.\par

## 2023-05-05 NOTE — ASSESSMENT
[FreeTextEntry1] : Ms. Cooley a 62 year old female history of allergic rhinitis, asthma, BOOP, sicca, COVID infection who presents via video for sick visit. Her chief complaint is chest congestion. \par \par RVP done yesterday was canceled due to improper collection.\par \par 1. URI symptoms\par -Likely related to viral infection\par -Will reorder RVP\par -if RVP is negative, will start on antibiotics\par \par 2.Asthma (active)\par -Restart Ventolin inhaler 2 puffs every 6 hours\par -Continue Anoro 1 inhalation daily\par -Restart Singulair 10 mg before bedtime\par \par 3.  Postnasal drip/allergies\par -Start Flonase 1 spray each nostril twice a day\par -Start azelastine 2 sprays each nostril twice a day\par -Can use Sudafed as needed\par \par 4.Cough\par -likely r/t PND\par -add benzonatate 200 mg TID PRN  \par \par Patient to follow up with Dr. Vidal as scheduled.\par Patient to call with further questions and concerns.\par Patient verbalizes understanding of care plan and is agreeable.\par

## 2023-05-05 NOTE — REASON FOR VISIT
[Home] : at home, [unfilled] , at the time of the visit. [Medical Office: ([facility])___] : at the medical office located in  [Patient] : the patient [Follow-Up] : a follow-up visit [Asthma] : asthma [Cough] : cough

## 2023-05-06 ENCOUNTER — NON-APPOINTMENT (OUTPATIENT)
Age: 63
End: 2023-05-06

## 2023-05-06 LAB
RAPID RVP RESULT: DETECTED
RSV RNA SPEC QL NAA+PROBE: DETECTED
SARS-COV-2 RNA PNL RESP NAA+PROBE: NOT DETECTED

## 2023-05-11 ENCOUNTER — TRANSCRIPTION ENCOUNTER (OUTPATIENT)
Age: 63
End: 2023-05-11

## 2023-05-12 ENCOUNTER — NON-APPOINTMENT (OUTPATIENT)
Age: 63
End: 2023-05-12

## 2023-07-24 ENCOUNTER — RX RENEWAL (OUTPATIENT)
Age: 63
End: 2023-07-24

## 2023-07-25 ENCOUNTER — APPOINTMENT (OUTPATIENT)
Dept: PULMONOLOGY | Facility: CLINIC | Age: 63
End: 2023-07-25
Payer: COMMERCIAL

## 2023-07-25 VITALS
WEIGHT: 120 LBS | RESPIRATION RATE: 16 BRPM | SYSTOLIC BLOOD PRESSURE: 110 MMHG | BODY MASS INDEX: 19.29 KG/M2 | DIASTOLIC BLOOD PRESSURE: 70 MMHG | HEIGHT: 66 IN | HEART RATE: 63 BPM | OXYGEN SATURATION: 98 % | TEMPERATURE: 97.1 F

## 2023-07-25 PROCEDURE — 94729 DIFFUSING CAPACITY: CPT

## 2023-07-25 PROCEDURE — 99214 OFFICE O/P EST MOD 30 MIN: CPT | Mod: 25

## 2023-07-25 PROCEDURE — 95012 NITRIC OXIDE EXP GAS DETER: CPT

## 2023-07-25 PROCEDURE — 94010 BREATHING CAPACITY TEST: CPT

## 2023-07-25 PROCEDURE — 94727 GAS DIL/WSHOT DETER LNG VOL: CPT

## 2023-07-25 NOTE — ADDENDUM
[FreeTextEntry1] : Documented by Gwen Montemayor acting as a scribe for Dr. Paulo Vidal on 07/25/2023. All medical record entries made by the Scribe were at my, Dr. Paulo Vidal's, direction and personally dictated by me on 07/25/2023. I have reviewed the chart and agree that the record accurately reflects my personal performance of the history, physical exam, assessment and plan. I have also personally directed, reviewed, and agree with the discharge instructions.\par \par

## 2023-07-25 NOTE — HISTORY OF PRESENT ILLNESS
[FreeTextEntry1] : Ms. Cooley a 62 year old female presenting to the office for a follow up visit for allergic rhinitis, asthma, BOOP, sicca, and shortness of breath. Her chief complaint is \par \par -she notes feeling generally well \par -she notes energy levels are good \par -she notes exercising (tennis)\par -she notes past URIs\par -she notes weight is stable \par -she notes bowels are regular \par -she notes sleeping for 8 hours\par - she notes nocturia x 1-2\par - she notes hydrating well\par - she notes traveling in the future\par -she denies taking any new medications, vitamins, or supplements \par -she notes vision is stable \par -she notes dry eyes \par - she denies snoring \par - she notes having Corona virus in fall of 2022\par - \par \par \par -she denies any headaches, nausea, emesis, fever, chills, sweats, chest pain, chest pressure, coughing, wheezing, palpitations, diarrhea, constipation, dysphagia, vertigo, arthralgias, myalgias, leg swelling, itchy eyes, itchy ears, heartburn, reflux, or sour taste in the mouth.

## 2023-07-25 NOTE — ASSESSMENT
[FreeTextEntry1] : Ms. Cooley is a 62 year old female who has a history of herniated disc, asthma, allergy, BOOP, and sicca. S/p  COVID-19 induced bronchospasm -now resolved/ @ baseline; coronavirus for 2022 - stable \par \par Her shortness of breath is multifactorial due to:\par -asthma\par -allergies\par -former BOOP\par -poor mechanics of breathing\par \par \par \par problem 1: asthma (quiet) \par -continue to use Anoro at 1 inhalation QD\par -continue to use Ventolin PRN before exercise\par -continue to use Singulair 10 mg before exercise \par -Asthma is  believed to be caused by inherited (genetic) and environmental factor, but its exact cause is unknown. Asthma may be triggered by allergens, lung infections, or irritants in the air. Asthma triggers are different for each person \par -Inhaler technique reviewed as well as oral hygiene techniques reviewed with patient. Avoidance of cold air, extremes of temperature, rescue inhaler should be used before exercise. Order of medication reviewed with patient. Recommended use of a cool mist humidifier in the bedroom. \par \par Problem 1A: COVID-19 3/2022 (resolved) \par -Recommend Throat Coat Tea\par -Add Claritin 10 mg QAM \par -Add Benadryl 25 QHS \par -No MAB\par -no Paxlovid \par -s/p Prednisone 20mg for 7 days then 10mg for 7 days \par -Information sheet given to the patient to be reviewed, this medication is never to be used without consulting the prescribing physician. Proper dietary restraint is necessary specifically salt containing foods, if any reaction may occur should be reported. \par COVID-19 precautionary Immune Support Recommendations:\par -OTC Vitamin C 500mg BID \par -OTC Quercetin 250-500mg BID \par -OTC Zinc 75-100mg per day \par -OTC Melatonin 1 or 2mg a night \par -OTC Vitamin D 1-4000mg per day \par \par Problem 1B: Coronavirus Fall 2022 - resolved \par \par Problem 1 C: RSV 5/2023 (resolved)\par \par problem 2: allergies (stable)\par -continue to use nasal saline PRN\par -Environmental measures for allergies were encouraged including mattress and pillow cover, air purifier, and environmental controls. \par \par problem 3: BOOP\par -in remission\par \par problem 4: sicca (due to Anoro)\par -recommended to gargle and spit after using the inhaler, throat coat/slippery elm tea\par -blood work to include: SED rate, Sjogren's panel, and rheumatoid factor (pending)\par \par problem 5: epistaxis\par -recommended nasal gel/ Boroleum ointment \par \par problem 6: Health Maintenance/COVID19 Precautions:\par -Covid-19 x3 vaccine\par - Clean your hands often. Wash your hands often with soap and water for at least 20 seconds, especially after blowing your nose, coughing, or sneezing, or having been in a public place.\par - If soap and water are not available, use a hand  that contains at least 60% alcohol.\par - To the extent possible, avoid touching high-touch surfaces in public places - elevator buttons, door handles, handrails, handshaking with people, etc. Use a tissue or your sleeve to cover your hand or finger if you must touch something.\par - Wash your hands after touching surfaces in public places.\par - Avoid touching your face, nose, eyes, etc.\par - Clean and disinfect your home to remove germs: practice routine cleaning of frequently touched surfaces (for example: tables, doorknobs, light switches, handles, desks, toilets, faucets, sinks & cell phones)\par - Avoid crowds, especially in poorly ventilated spaces. Your risk of exposure to respiratory viruses like COVID-19 may increase in crowded, closed-in settings with little air circulation if there are people in the crowd who are sick. All patients are recommended to practice social distancing and stay at least 6 feet away from others.\par - Avoid all non-essential travel including plane trips, and especially avoid embarking on cruise ships.\par -If COVID-19 is spreading in your community, take extra measures to put distance between yourself and other people to further reduce your risk of being exposed to this new virus.\par -Stay home as much as possible.\par - Consider ways of getting food brought to your house through family, social, or commercial networks\par -Be aware that the virus has been known to live in the air up to 3 hours post exposure, cardboard up to 24 hours post exposure, copper up to 4 hours post exposure, steel and plastic up to 2-3 days post exposure. Risk of transmission from these surfaces are affected by many variables.\par Immune Support Recommendations:\par -OTC Vitamin C 500mg BID \par -OTC Quercetin 250-500mg BID \par -OTC Zinc 75-100mg per day \par -OTC Melatonin 1 or 2 mg a night \par -OTC Vitamin D 1-4000mg per day \par -OTC Tonic Water 8oz per day\par Asthma and COVID19:\par You need to make sure your asthma is under control. This often requires the use of inhaled corticosteroids (and sometimes oral corticosteroids). Inhaled corticosteroids do not likely reduce your immune system’s ability to fight infections, but oral corticosteroids may. It is important to use the steps above to protect yourself to limit your exposure to any respiratory virus. \par \par problem 7: health maintenance \par -Recommended Kevin Vicky book on 10 day Detox \par -recommended to try optimal electrolytes\par -s/p shingles shot \par -recommended yearly flu shot after October 15\par -recommended strep pneumonia vaccines: Prevnar-13 vaccine, followed by Pneumo vaccine 23 one year following\par -recommended early intervention for URIs\par -recommended regular osteoporosis evaluations\par -recommended early dermatological evaluations\par -recommended after the age of 50 to the age of 70, colonoscopy every 5 years \par \par \par F/U in 6 months, spi niox, Full PFTs\par She is encouraged to call with any changes, concerns, or questions

## 2023-07-25 NOTE — PROCEDURE
[FreeTextEntry1] : Full PFT reveals normal flows; FEV1 was  2.94L which is 113% of predicted; normal lung volumes; normal diffusion at 17.5 , which is 95% of predicted; normal flow volume loop.\par PFTs were performed to evaluate for SOB and asthma \par \par FENO was 14; a normal value being less than 25\par Fractional exhaled nitric oxide (FENO) is regarded as a simple, noninvasive method for assessing eosinophilic airway inflammation. Produced by a variety of cells within the lung, nitric oxide (NO) concentrations are generally low in healthy individuals. However, high concentrations of NO appear to be involved in nonspecific host defense mechanisms and chronic inflammatory diseases such as asthma. The American Thoracic Society (ATS) therefore has recommended using FENO to aid in the diagnosis and monitoring of eosinophilic airway inflammation and asthma, and for identifying steroid responsive individuals whose chronic respiratory symptoms may be caused by airway inflammation.

## 2023-07-28 ENCOUNTER — TRANSCRIPTION ENCOUNTER (OUTPATIENT)
Age: 63
End: 2023-07-28

## 2023-09-25 ENCOUNTER — NON-APPOINTMENT (OUTPATIENT)
Age: 63
End: 2023-09-25

## 2023-10-17 ENCOUNTER — TRANSCRIPTION ENCOUNTER (OUTPATIENT)
Age: 63
End: 2023-10-17

## 2023-10-17 RX ORDER — UMECLIDINIUM BROMIDE AND VILANTEROL TRIFENATATE 62.5; 25 UG/1; UG/1
62.5-25 POWDER RESPIRATORY (INHALATION)
Qty: 180 | Refills: 1 | Status: DISCONTINUED | COMMUNITY
Start: 2018-02-05 | End: 2023-10-17

## 2024-01-08 ENCOUNTER — APPOINTMENT (OUTPATIENT)
Dept: MAMMOGRAPHY | Facility: CLINIC | Age: 64
End: 2024-01-08
Payer: COMMERCIAL

## 2024-01-08 ENCOUNTER — OUTPATIENT (OUTPATIENT)
Dept: OUTPATIENT SERVICES | Facility: HOSPITAL | Age: 64
LOS: 1 days | End: 2024-01-08
Payer: COMMERCIAL

## 2024-01-08 ENCOUNTER — APPOINTMENT (OUTPATIENT)
Dept: ULTRASOUND IMAGING | Facility: CLINIC | Age: 64
End: 2024-01-08
Payer: COMMERCIAL

## 2024-01-08 DIAGNOSIS — Z12.31 ENCOUNTER FOR SCREENING MAMMOGRAM FOR MALIGNANT NEOPLASM OF BREAST: ICD-10-CM

## 2024-01-08 DIAGNOSIS — Z80.3 FAMILY HISTORY OF MALIGNANT NEOPLASM OF BREAST: ICD-10-CM

## 2024-01-08 DIAGNOSIS — R92.2 INCONCLUSIVE MAMMOGRAM: ICD-10-CM

## 2024-01-08 DIAGNOSIS — Z91.89 OTHER SPECIFIED PERSONAL RISK FACTORS, NOT ELSEWHERE CLASSIFIED: ICD-10-CM

## 2024-01-08 DIAGNOSIS — Z15.09 GENETIC SUSCEPTIBILITY TO OTHER MALIGNANT NEOPLASM: ICD-10-CM

## 2024-01-08 DIAGNOSIS — Z15.89 GENETIC SUSCEPTIBILITY TO OTHER DISEASE: ICD-10-CM

## 2024-01-08 DIAGNOSIS — Z00.8 ENCOUNTER FOR OTHER GENERAL EXAMINATION: ICD-10-CM

## 2024-01-08 DIAGNOSIS — Z80.9 FAMILY HISTORY OF MALIGNANT NEOPLASM, UNSPECIFIED: ICD-10-CM

## 2024-01-08 PROCEDURE — 77063 BREAST TOMOSYNTHESIS BI: CPT | Mod: 26

## 2024-01-08 PROCEDURE — 77063 BREAST TOMOSYNTHESIS BI: CPT

## 2024-01-08 PROCEDURE — 77067 SCR MAMMO BI INCL CAD: CPT

## 2024-01-08 PROCEDURE — 77067 SCR MAMMO BI INCL CAD: CPT | Mod: 26

## 2024-01-08 PROCEDURE — 76641 ULTRASOUND BREAST COMPLETE: CPT | Mod: 26,50

## 2024-01-08 PROCEDURE — 76641 ULTRASOUND BREAST COMPLETE: CPT

## 2024-01-11 ENCOUNTER — APPOINTMENT (OUTPATIENT)
Dept: PULMONOLOGY | Facility: CLINIC | Age: 64
End: 2024-01-11
Payer: COMMERCIAL

## 2024-01-11 VITALS
SYSTOLIC BLOOD PRESSURE: 110 MMHG | HEART RATE: 58 BPM | WEIGHT: 120 LBS | DIASTOLIC BLOOD PRESSURE: 64 MMHG | TEMPERATURE: 97.3 F | HEIGHT: 66 IN | OXYGEN SATURATION: 99 % | BODY MASS INDEX: 19.29 KG/M2 | RESPIRATION RATE: 16 BRPM

## 2024-01-11 DIAGNOSIS — J45.909 UNSPECIFIED ASTHMA, UNCOMPLICATED: ICD-10-CM

## 2024-01-11 DIAGNOSIS — J84.89 OTHER SPECIFIED INTERSTITIAL PULMONARY DISEASES: ICD-10-CM

## 2024-01-11 DIAGNOSIS — R06.02 SHORTNESS OF BREATH: ICD-10-CM

## 2024-01-11 DIAGNOSIS — M35.00 SICCA SYNDROME, UNSPECIFIED: ICD-10-CM

## 2024-01-11 DIAGNOSIS — U07.1 COVID-19: ICD-10-CM

## 2024-01-11 DIAGNOSIS — J30.9 ALLERGIC RHINITIS, UNSPECIFIED: ICD-10-CM

## 2024-01-11 PROCEDURE — 94010 BREATHING CAPACITY TEST: CPT

## 2024-01-11 PROCEDURE — 99214 OFFICE O/P EST MOD 30 MIN: CPT | Mod: 25

## 2024-01-11 PROCEDURE — 95012 NITRIC OXIDE EXP GAS DETER: CPT

## 2024-01-11 RX ORDER — UMECLIDINIUM BROMIDE AND VILANTEROL TRIFENATATE 62.5; 25 UG/1; UG/1
62.5-25 POWDER RESPIRATORY (INHALATION) DAILY
Qty: 3 | Refills: 1 | Status: DISCONTINUED | COMMUNITY
Start: 2018-08-02 | End: 2024-01-11

## 2024-01-11 RX ORDER — UMECLIDINIUM BROMIDE AND VILANTEROL TRIFENATATE 62.5; 25 UG/1; UG/1
62.5-25 POWDER RESPIRATORY (INHALATION) DAILY
Qty: 3 | Refills: 1 | Status: DISCONTINUED | COMMUNITY
Start: 2019-10-15 | End: 2024-01-11

## 2024-01-11 RX ORDER — UMECLIDINIUM BROMIDE AND VILANTEROL TRIFENATATE 62.5; 25 UG/1; UG/1
62.5-25 POWDER RESPIRATORY (INHALATION)
Qty: 2 | Refills: 1 | Status: DISCONTINUED | COMMUNITY
Start: 2017-08-03 | End: 2024-01-11

## 2024-01-11 RX ORDER — AMOXICILLIN AND CLAVULANATE POTASSIUM 875; 125 MG/1; MG/1
875-125 TABLET, COATED ORAL
Qty: 28 | Refills: 0 | Status: DISCONTINUED | COMMUNITY
Start: 2023-05-11 | End: 2024-01-11

## 2024-01-11 RX ORDER — UMECLIDINIUM BROMIDE AND VILANTEROL TRIFENATATE 62.5; 25 UG/1; UG/1
62.5-25 POWDER RESPIRATORY (INHALATION) DAILY
Qty: 3 | Refills: 1 | Status: DISCONTINUED | COMMUNITY
Start: 2019-04-02 | End: 2024-01-11

## 2024-01-11 NOTE — ASSESSMENT
[FreeTextEntry1] : Ms. Cooley is a 63 year old female who has a history of herniated disc, asthma, allergy, BOOP, and sicca. S/p COVID-19 induced bronchospasm -now resolved/ @ baseline; coronavirus for 2022 - stable  Her shortness of breath is multifactorial due to: -asthma -allergies -former BOOP -poor mechanics of breathing  problem 1: asthma (quiet) -continue to use Anoro at 1 inhalation QD/Stiolto 2 puffs Q6H -continue to use Ventolin PRN before exercise -continue to use Singulair 10 mg before exercise -Asthma is believed to be caused by inherited (genetic) and environmental factor, but its exact cause is unknown. Asthma may be triggered by allergens, lung infections, or irritants in the air. Asthma triggers are different for each person -Inhaler technique reviewed as well as oral hygiene techniques reviewed with patient. Avoidance of cold air, extremes of temperature, rescue inhaler should be used before exercise. Order of medication reviewed with patient. Recommended use of a cool mist humidifier in the bedroom.  Problem 1A: COVID-19 3/2022 (resolved) -Recommend Throat Coat Tea -Add Claritin 10 mg QAM -Add Benadryl 25 QHS -No MAB -no Paxlovid -s/p Prednisone 20mg for 7 days then 10mg for 7 days -Information sheet given to the patient to be reviewed, this medication is never to be used without consulting the prescribing physician. Proper dietary restraint is necessary specifically salt containing foods, if any reaction may occur should be reported. COVID-19 precautionary Immune Support Recommendations: -OTC Vitamin C 500mg BID -OTC Quercetin 250-500mg BID -OTC Zinc 75-100mg per day -OTC Melatonin 1 or 2mg a night -OTC Vitamin D 1-4000mg per day  Problem 1B: Coronavirus Fall 2022 - resolved  Problem 1 C: RSV 5/2023 (resolved)  problem 2: allergies (stable) -continue to use nasal saline PRN -Environmental measures for allergies were encouraged including mattress and pillow cover, air purifier, and environmental controls.  problem 3: BOOP -in remission  problem 4: sicca (due to Anoro) -recommended to gargle and spit after using the inhaler, throat coat/slippery elm tea -blood work to include: SED rate, Sjogren's panel, and rheumatoid factor (pending)  problem 5: epistaxis -recommended nasal gel/ Boroleum ointment  problem 6: Health Maintenance/COVID19 Precautions: -Covid-19 x3 vaccine - Clean your hands often. Wash your hands often with soap and water for at least 20 seconds, especially after blowing your nose, coughing, or sneezing, or having been in a public place. - If soap and water are not available, use a hand  that contains at least 60% alcohol. - To the extent possible, avoid touching high-touch surfaces in public places - elevator buttons, door handles, handrails, handshaking with people, etc. Use a tissue or your sleeve to cover your hand or finger if you must touch something. - Wash your hands after touching surfaces in public places. - Avoid touching your face, nose, eyes, etc. - Clean and disinfect your home to remove germs: practice routine cleaning of frequently touched surfaces (for example: tables, doorknobs, light switches, handles, desks, toilets, faucets, sinks & cell phones) - Avoid crowds, especially in poorly ventilated spaces. Your risk of exposure to respiratory viruses like COVID-19 may increase in crowded, closed-in settings with little air circulation if there are people in the crowd who are sick. All patients are recommended to practice social distancing and stay at least 6 feet away from others. - Avoid all non-essential travel including plane trips, and especially avoid embarking on cruise ships. -If COVID-19 is spreading in your community, take extra measures to put distance between yourself and other people to further reduce your risk of being exposed to this new virus. -Stay home as much as possible. - Consider ways of getting food brought to your house through family, social, or commercial networks -Be aware that the virus has been known to live in the air up to 3 hours post exposure, cardboard up to 24 hours post exposure, copper up to 4 hours post exposure, steel and plastic up to 2-3 days post exposure. Risk of transmission from these surfaces are affected by many variables. Immune Support Recommendations: -OTC Vitamin C 500mg BID -OTC Quercetin 250-500mg BID -OTC Zinc 75-100mg per day -OTC Melatonin 1 or 2 mg a night -OTC Vitamin D 1-4000mg per day -OTC Tonic Water 8oz per day Asthma and COVID19: You need to make sure your asthma is under control. This often requires the use of inhaled corticosteroids (and sometimes oral corticosteroids). Inhaled corticosteroids do not likely reduce your immune system's ability to fight infections, but oral corticosteroids may. It is important to use the steps above to protect yourself to limit your exposure to any respiratory virus.  problem 7: health maintenance -Recommended Kevin Perry book on 10 day Detox -recommended to try optimal electrolytes -s/p shingles shot -recommended yearly flu shot after October 15 2023 -recommended strep pneumonia vaccines: Prevnar-13 vaccine, followed by Pneumo vaccine 23 one year following -recommended early intervention for URIs -recommended regular osteoporosis evaluations -recommended early dermatological evaluations -recommended after the age of 50 to the age of 70, colonoscopy every 5 years  F/U in 6 months, spi niox, Full PFTs  She is encouraged to call with any changes, concerns, or questions.

## 2024-01-11 NOTE — ADDENDUM
[FreeTextEntry1] : Documented by Benjy Camilo acting as a scribe for Dr. Paulo Vidal on 01/11/2024. All medical record entries made by the Scribe were at my, Dr. Paulo Vidal's, direction and personally dictated by me on 01/11/2024. I have reviewed the chart and agree that the record accurately reflects my personal performance of the history, physical exam, assessment and plan. I have also personally directed, reviewed, and agree with the discharge instructions.

## 2024-01-11 NOTE — PROCEDURE
[FreeTextEntry1] : PFTs revealed normal flows; FEV1 was 2.69 L, which is 82.5% of predicted; flat inspiratory limb.  PFTs were performed to evaluate for SOB  FENO was 11 ; a normal value being less than 25 Fractional exhaled nitric oxide (FENO) is regarded as a simple, noninvasive method for assessing eosinophilic airway inflammation. Produced by a variety of cells within the lung, nitric oxide (NO) concentrations are generally low in healthy individuals. However, high concentrations of NO appear to be involved in nonspecific host defense mechanisms and chronic inflammatory diseases such as asthma. The American Thoracic Society (ATS) therefore has recommended using FENO to aid in the diagnosis and monitoring of eosinophilic airway inflammation and asthma, and for identifying steroid responsive individuals whose chronic respiratory symptoms may be caused by airway inflammation.   The American Thor[employer] Society (ATS) strongly recommends the use of FeNO measurement to aid in the assessment, management, and long-term monitoring of asthma. In their 2011 clinical practice guideline, the ATS emphasizes the importance of using FeNO.

## 2024-02-02 ENCOUNTER — TRANSCRIPTION ENCOUNTER (OUTPATIENT)
Age: 64
End: 2024-02-02

## 2024-02-04 ENCOUNTER — RX RENEWAL (OUTPATIENT)
Age: 64
End: 2024-02-04

## 2024-06-21 ENCOUNTER — TRANSCRIPTION ENCOUNTER (OUTPATIENT)
Age: 64
End: 2024-06-21

## 2024-06-26 ENCOUNTER — TRANSCRIPTION ENCOUNTER (OUTPATIENT)
Age: 64
End: 2024-06-26

## 2024-06-26 RX ORDER — TIOTROPIUM BROMIDE AND OLODATEROL 3.124; 2.736 UG/1; UG/1
2.5-2.5 SPRAY, METERED RESPIRATORY (INHALATION)
Qty: 12 | Refills: 2 | Status: ACTIVE | COMMUNITY
Start: 2023-10-17 | End: 1900-01-01

## 2024-07-18 ENCOUNTER — APPOINTMENT (OUTPATIENT)
Dept: PULMONOLOGY | Facility: CLINIC | Age: 64
End: 2024-07-18

## 2024-07-19 ENCOUNTER — APPOINTMENT (OUTPATIENT)
Dept: PULMONOLOGY | Facility: CLINIC | Age: 64
End: 2024-07-19
Payer: COMMERCIAL

## 2024-07-19 VITALS
RESPIRATION RATE: 16 BRPM | TEMPERATURE: 97.2 F | HEART RATE: 69 BPM | DIASTOLIC BLOOD PRESSURE: 64 MMHG | HEIGHT: 66 IN | BODY MASS INDEX: 19.29 KG/M2 | SYSTOLIC BLOOD PRESSURE: 110 MMHG | WEIGHT: 120 LBS | OXYGEN SATURATION: 97 %

## 2024-07-19 DIAGNOSIS — J45.909 UNSPECIFIED ASTHMA, UNCOMPLICATED: ICD-10-CM

## 2024-07-19 DIAGNOSIS — J84.89 OTHER SPECIFIED INTERSTITIAL PULMONARY DISEASES: ICD-10-CM

## 2024-07-19 DIAGNOSIS — J30.9 ALLERGIC RHINITIS, UNSPECIFIED: ICD-10-CM

## 2024-07-19 DIAGNOSIS — M35.00 SICCA SYNDROME, UNSPECIFIED: ICD-10-CM

## 2024-07-19 DIAGNOSIS — U07.1 COVID-19: ICD-10-CM

## 2024-07-19 PROCEDURE — 94010 BREATHING CAPACITY TEST: CPT

## 2024-07-19 PROCEDURE — 94729 DIFFUSING CAPACITY: CPT

## 2024-07-19 PROCEDURE — 99214 OFFICE O/P EST MOD 30 MIN: CPT | Mod: 25

## 2024-07-19 PROCEDURE — 95012 NITRIC OXIDE EXP GAS DETER: CPT

## 2024-12-02 ENCOUNTER — APPOINTMENT (OUTPATIENT)
Dept: PULMONOLOGY | Facility: CLINIC | Age: 64
End: 2024-12-02
Payer: COMMERCIAL

## 2024-12-02 VITALS
HEIGHT: 66 IN | HEART RATE: 64 BPM | DIASTOLIC BLOOD PRESSURE: 68 MMHG | OXYGEN SATURATION: 99 % | TEMPERATURE: 97.9 F | RESPIRATION RATE: 16 BRPM | BODY MASS INDEX: 19.29 KG/M2 | WEIGHT: 120 LBS | SYSTOLIC BLOOD PRESSURE: 110 MMHG

## 2024-12-02 DIAGNOSIS — J84.89 OTHER SPECIFIED INTERSTITIAL PULMONARY DISEASES: ICD-10-CM

## 2024-12-02 DIAGNOSIS — J30.9 ALLERGIC RHINITIS, UNSPECIFIED: ICD-10-CM

## 2024-12-02 DIAGNOSIS — R06.02 SHORTNESS OF BREATH: ICD-10-CM

## 2024-12-02 DIAGNOSIS — J45.909 UNSPECIFIED ASTHMA, UNCOMPLICATED: ICD-10-CM

## 2024-12-02 PROCEDURE — 94010 BREATHING CAPACITY TEST: CPT

## 2024-12-02 PROCEDURE — 99214 OFFICE O/P EST MOD 30 MIN: CPT | Mod: 25

## 2025-01-30 ENCOUNTER — APPOINTMENT (OUTPATIENT)
Dept: MAMMOGRAPHY | Facility: CLINIC | Age: 65
End: 2025-01-30
Payer: COMMERCIAL

## 2025-01-30 ENCOUNTER — APPOINTMENT (OUTPATIENT)
Dept: ULTRASOUND IMAGING | Facility: CLINIC | Age: 65
End: 2025-01-30
Payer: COMMERCIAL

## 2025-01-30 ENCOUNTER — OUTPATIENT (OUTPATIENT)
Dept: OUTPATIENT SERVICES | Facility: HOSPITAL | Age: 65
LOS: 1 days | End: 2025-01-30
Payer: COMMERCIAL

## 2025-01-30 DIAGNOSIS — Z00.8 ENCOUNTER FOR OTHER GENERAL EXAMINATION: ICD-10-CM

## 2025-01-30 PROCEDURE — 77063 BREAST TOMOSYNTHESIS BI: CPT | Mod: 26

## 2025-01-30 PROCEDURE — 77067 SCR MAMMO BI INCL CAD: CPT

## 2025-01-30 PROCEDURE — 76641 ULTRASOUND BREAST COMPLETE: CPT | Mod: 26,50

## 2025-01-30 PROCEDURE — 77067 SCR MAMMO BI INCL CAD: CPT | Mod: 26

## 2025-01-30 PROCEDURE — 77063 BREAST TOMOSYNTHESIS BI: CPT

## 2025-01-30 PROCEDURE — 76641 ULTRASOUND BREAST COMPLETE: CPT

## 2025-03-24 ENCOUNTER — TRANSCRIPTION ENCOUNTER (OUTPATIENT)
Age: 65
End: 2025-03-24

## 2025-03-24 RX ORDER — AZITHROMYCIN 500 MG/1
500 TABLET, FILM COATED ORAL DAILY
Qty: 5 | Refills: 0 | Status: ACTIVE | COMMUNITY
Start: 2025-03-24 | End: 1900-01-01

## 2025-06-02 ENCOUNTER — APPOINTMENT (OUTPATIENT)
Dept: PULMONOLOGY | Facility: CLINIC | Age: 65
End: 2025-06-02
Payer: COMMERCIAL

## 2025-06-02 VITALS
RESPIRATION RATE: 16 BRPM | WEIGHT: 120 LBS | SYSTOLIC BLOOD PRESSURE: 110 MMHG | OXYGEN SATURATION: 97 % | DIASTOLIC BLOOD PRESSURE: 62 MMHG | TEMPERATURE: 98.1 F | BODY MASS INDEX: 19.29 KG/M2 | HEART RATE: 67 BPM | HEIGHT: 66 IN

## 2025-06-02 DIAGNOSIS — M35.00 SICCA SYNDROME, UNSPECIFIED: ICD-10-CM

## 2025-06-02 DIAGNOSIS — J84.89 OTHER SPECIFIED INTERSTITIAL PULMONARY DISEASES: ICD-10-CM

## 2025-06-02 DIAGNOSIS — J45.909 UNSPECIFIED ASTHMA, UNCOMPLICATED: ICD-10-CM

## 2025-06-02 DIAGNOSIS — R06.02 SHORTNESS OF BREATH: ICD-10-CM

## 2025-06-02 DIAGNOSIS — J30.9 ALLERGIC RHINITIS, UNSPECIFIED: ICD-10-CM

## 2025-06-02 PROCEDURE — 94010 BREATHING CAPACITY TEST: CPT

## 2025-06-02 PROCEDURE — 94729 DIFFUSING CAPACITY: CPT

## 2025-06-02 PROCEDURE — ZZZZZ: CPT

## 2025-06-02 PROCEDURE — 99214 OFFICE O/P EST MOD 30 MIN: CPT | Mod: 25

## 2025-09-08 ENCOUNTER — APPOINTMENT (OUTPATIENT)
Dept: PULMONOLOGY | Facility: CLINIC | Age: 65
End: 2025-09-08
Payer: COMMERCIAL

## 2025-09-08 ENCOUNTER — NON-APPOINTMENT (OUTPATIENT)
Age: 65
End: 2025-09-08

## 2025-09-08 DIAGNOSIS — R06.02 SHORTNESS OF BREATH: ICD-10-CM

## 2025-09-08 DIAGNOSIS — R09.82 POSTNASAL DRIP: ICD-10-CM

## 2025-09-08 DIAGNOSIS — J30.9 ALLERGIC RHINITIS, UNSPECIFIED: ICD-10-CM

## 2025-09-08 DIAGNOSIS — J84.89 OTHER SPECIFIED INTERSTITIAL PULMONARY DISEASES: ICD-10-CM

## 2025-09-08 DIAGNOSIS — J45.909 UNSPECIFIED ASTHMA, UNCOMPLICATED: ICD-10-CM

## 2025-09-08 PROCEDURE — 99214 OFFICE O/P EST MOD 30 MIN: CPT | Mod: 95

## 2025-09-08 RX ORDER — ALBUTEROL SULFATE 2.5 MG/3ML
(2.5 MG/3ML) SOLUTION RESPIRATORY (INHALATION) EVERY 6 HOURS
Qty: 1 | Refills: 2 | Status: ACTIVE | COMMUNITY
Start: 2025-09-08 | End: 1900-01-01

## 2025-09-08 RX ORDER — AZELASTINE HYDROCHLORIDE 137 UG/1
0.1 SPRAY, METERED NASAL TWICE DAILY
Qty: 1 | Refills: 3 | Status: ACTIVE | COMMUNITY
Start: 2025-09-08 | End: 1900-01-01

## 2025-09-08 RX ORDER — PREDNISONE 10 MG/1
10 TABLET ORAL
Qty: 21 | Refills: 0 | Status: ACTIVE | COMMUNITY
Start: 2025-09-08 | End: 1900-01-01